# Patient Record
Sex: FEMALE | Race: WHITE | NOT HISPANIC OR LATINO | Employment: OTHER | URBAN - METROPOLITAN AREA
[De-identification: names, ages, dates, MRNs, and addresses within clinical notes are randomized per-mention and may not be internally consistent; named-entity substitution may affect disease eponyms.]

---

## 2024-07-27 ENCOUNTER — HOSPITAL ENCOUNTER (INPATIENT)
Facility: HOSPITAL | Age: 89
DRG: 322 | End: 2024-07-27
Attending: STUDENT IN AN ORGANIZED HEALTH CARE EDUCATION/TRAINING PROGRAM | Admitting: INTERNAL MEDICINE
Payer: MEDICARE

## 2024-07-27 ENCOUNTER — APPOINTMENT (OUTPATIENT)
Dept: CARDIOLOGY | Facility: HOSPITAL | Age: 89
DRG: 322 | End: 2024-07-27
Payer: MEDICARE

## 2024-07-27 DIAGNOSIS — I21.3 ST ELEVATION MYOCARDIAL INFARCTION (STEMI), UNSPECIFIED ARTERY (MULTI): Primary | ICD-10-CM

## 2024-07-27 DIAGNOSIS — I21.3 STEMI (ST ELEVATION MYOCARDIAL INFARCTION) (MULTI): ICD-10-CM

## 2024-07-27 DIAGNOSIS — I21.21 ST ELEVATION (STEMI) MYOCARDIAL INFARCTION INVOLVING LEFT CIRCUMFLEX CORONARY ARTERY (MULTI): ICD-10-CM

## 2024-07-27 LAB
ALBUMIN SERPL BCP-MCNC: 3.9 G/DL (ref 3.4–5)
ALP SERPL-CCNC: 81 U/L (ref 33–136)
ALT SERPL W P-5'-P-CCNC: 12 U/L (ref 7–45)
ANION GAP SERPL CALC-SCNC: 16 MMOL/L (ref 10–20)
AORTIC VALVE MEAN GRADIENT: 2 MMHG
AORTIC VALVE PEAK VELOCITY: 0.98 M/S
AST SERPL W P-5'-P-CCNC: 28 U/L (ref 9–39)
AV PEAK GRADIENT: 3.8 MMHG
AVA (PEAK VEL): 1.59 CM2
AVA (VTI): 1.59 CM2
BASOPHILS # BLD AUTO: 0.02 X10*3/UL (ref 0–0.1)
BASOPHILS NFR BLD AUTO: 0.2 %
BILIRUB SERPL-MCNC: 0.4 MG/DL (ref 0–1.2)
BUN SERPL-MCNC: 31 MG/DL (ref 6–23)
CALCIUM SERPL-MCNC: 10 MG/DL (ref 8.6–10.3)
CARDIAC TROPONIN I PNL SERPL HS: 916 NG/L (ref 0–13)
CARDIAC TROPONIN I PNL SERPL HS: ABNORMAL NG/L (ref 0–13)
CHLORIDE SERPL-SCNC: 102 MMOL/L (ref 98–107)
CHOLEST SERPL-MCNC: 234 MG/DL (ref 0–199)
CHOLESTEROL/HDL RATIO: 5.8
CO2 SERPL-SCNC: 20 MMOL/L (ref 21–32)
CREAT SERPL-MCNC: 1.65 MG/DL (ref 0.5–1.05)
EGFRCR SERPLBLD CKD-EPI 2021: 30 ML/MIN/1.73M*2
EJECTION FRACTION APICAL 4 CHAMBER: 33.6
EJECTION FRACTION: 38 %
EOSINOPHIL # BLD AUTO: 0.22 X10*3/UL (ref 0–0.4)
EOSINOPHIL NFR BLD AUTO: 1.8 %
ERYTHROCYTE [DISTWIDTH] IN BLOOD BY AUTOMATED COUNT: 12.6 % (ref 11.5–14.5)
GLUCOSE BLD MANUAL STRIP-MCNC: 215 MG/DL (ref 74–99)
GLUCOSE BLD MANUAL STRIP-MCNC: 219 MG/DL (ref 74–99)
GLUCOSE BLD MANUAL STRIP-MCNC: 233 MG/DL (ref 74–99)
GLUCOSE SERPL-MCNC: 230 MG/DL (ref 74–99)
HCT VFR BLD AUTO: 45.1 % (ref 36–46)
HDLC SERPL-MCNC: 40.4 MG/DL
HGB BLD-MCNC: 14 G/DL (ref 12–16)
IMM GRANULOCYTES # BLD AUTO: 0.08 X10*3/UL (ref 0–0.5)
IMM GRANULOCYTES NFR BLD AUTO: 0.6 % (ref 0–0.9)
LDLC SERPL CALC-MCNC: 136 MG/DL
LEFT VENTRICLE INTERNAL DIMENSION DIASTOLE: 3.17 CM (ref 3.5–6)
LEFT VENTRICULAR OUTFLOW TRACT DIAMETER: 1.7 CM
LYMPHOCYTES # BLD AUTO: 3.21 X10*3/UL (ref 0.8–3)
LYMPHOCYTES NFR BLD AUTO: 25.7 %
MCH RBC QN AUTO: 30.6 PG (ref 26–34)
MCHC RBC AUTO-ENTMCNC: 31 G/DL (ref 32–36)
MCV RBC AUTO: 99 FL (ref 80–100)
MITRAL VALVE E/A RATIO: 1
MONOCYTES # BLD AUTO: 0.9 X10*3/UL (ref 0.05–0.8)
MONOCYTES NFR BLD AUTO: 7.2 %
NEUTROPHILS # BLD AUTO: 8.07 X10*3/UL (ref 1.6–5.5)
NEUTROPHILS NFR BLD AUTO: 64.5 %
NON HDL CHOLESTEROL: 194 MG/DL (ref 0–149)
NRBC BLD-RTO: 0 /100 WBCS (ref 0–0)
PLATELET # BLD AUTO: 158 X10*3/UL (ref 150–450)
POTASSIUM SERPL-SCNC: 4.3 MMOL/L (ref 3.5–5.3)
PROT SERPL-MCNC: 8.5 G/DL (ref 6.4–8.2)
RBC # BLD AUTO: 4.57 X10*6/UL (ref 4–5.2)
RIGHT VENTRICLE FREE WALL PEAK S': 11.7 CM/S
RIGHT VENTRICLE PEAK SYSTOLIC PRESSURE: 42.7 MMHG
SODIUM SERPL-SCNC: 134 MMOL/L (ref 136–145)
TRICUSPID ANNULAR PLANE SYSTOLIC EXCURSION: 1.7 CM
TRIGL SERPL-MCNC: 290 MG/DL (ref 0–149)
VLDL: 58 MG/DL (ref 0–40)
WBC # BLD AUTO: 12.5 X10*3/UL (ref 4.4–11.3)

## 2024-07-27 PROCEDURE — 2500000004 HC RX 250 GENERAL PHARMACY W/ HCPCS (ALT 636 FOR OP/ED): Performed by: STUDENT IN AN ORGANIZED HEALTH CARE EDUCATION/TRAINING PROGRAM

## 2024-07-27 PROCEDURE — 4A023N7 MEASUREMENT OF CARDIAC SAMPLING AND PRESSURE, LEFT HEART, PERCUTANEOUS APPROACH: ICD-10-PCS | Performed by: INTERNAL MEDICINE

## 2024-07-27 PROCEDURE — C1887 CATHETER, GUIDING: HCPCS | Performed by: INTERNAL MEDICINE

## 2024-07-27 PROCEDURE — 2500000002 HC RX 250 W HCPCS SELF ADMINISTERED DRUGS (ALT 637 FOR MEDICARE OP, ALT 636 FOR OP/ED): Performed by: INTERNAL MEDICINE

## 2024-07-27 PROCEDURE — C1894 INTRO/SHEATH, NON-LASER: HCPCS | Performed by: INTERNAL MEDICINE

## 2024-07-27 PROCEDURE — 82947 ASSAY GLUCOSE BLOOD QUANT: CPT

## 2024-07-27 PROCEDURE — 93458 L HRT ARTERY/VENTRICLE ANGIO: CPT | Mod: 59 | Performed by: INTERNAL MEDICINE

## 2024-07-27 PROCEDURE — 2500000004 HC RX 250 GENERAL PHARMACY W/ HCPCS (ALT 636 FOR OP/ED): Performed by: INTERNAL MEDICINE

## 2024-07-27 PROCEDURE — 99152 MOD SED SAME PHYS/QHP 5/>YRS: CPT | Performed by: INTERNAL MEDICINE

## 2024-07-27 PROCEDURE — 93306 TTE W/DOPPLER COMPLETE: CPT | Performed by: STUDENT IN AN ORGANIZED HEALTH CARE EDUCATION/TRAINING PROGRAM

## 2024-07-27 PROCEDURE — 2550000001 HC RX 255 CONTRASTS: Performed by: INTERNAL MEDICINE

## 2024-07-27 PROCEDURE — 99285 EMERGENCY DEPT VISIT HI MDM: CPT | Mod: 25

## 2024-07-27 PROCEDURE — 2500000001 HC RX 250 WO HCPCS SELF ADMINISTERED DRUGS (ALT 637 FOR MEDICARE OP): Performed by: INTERNAL MEDICINE

## 2024-07-27 PROCEDURE — 36415 COLL VENOUS BLD VENIPUNCTURE: CPT | Performed by: STUDENT IN AN ORGANIZED HEALTH CARE EDUCATION/TRAINING PROGRAM

## 2024-07-27 PROCEDURE — 2780000003 HC OR 278 NO HCPCS: Performed by: INTERNAL MEDICINE

## 2024-07-27 PROCEDURE — 2500000001 HC RX 250 WO HCPCS SELF ADMINISTERED DRUGS (ALT 637 FOR MEDICARE OP): Performed by: STUDENT IN AN ORGANIZED HEALTH CARE EDUCATION/TRAINING PROGRAM

## 2024-07-27 PROCEDURE — 99291 CRITICAL CARE FIRST HOUR: CPT | Performed by: INTERNAL MEDICINE

## 2024-07-27 PROCEDURE — 2020000001 HC ICU ROOM DAILY

## 2024-07-27 PROCEDURE — C1874 STENT, COATED/COV W/DEL SYS: HCPCS | Performed by: INTERNAL MEDICINE

## 2024-07-27 PROCEDURE — 2500000002 HC RX 250 W HCPCS SELF ADMINISTERED DRUGS (ALT 637 FOR MEDICARE OP, ALT 636 FOR OP/ED): Performed by: STUDENT IN AN ORGANIZED HEALTH CARE EDUCATION/TRAINING PROGRAM

## 2024-07-27 PROCEDURE — B2111ZZ FLUOROSCOPY OF MULTIPLE CORONARY ARTERIES USING LOW OSMOLAR CONTRAST: ICD-10-PCS | Performed by: INTERNAL MEDICINE

## 2024-07-27 PROCEDURE — 96374 THER/PROPH/DIAG INJ IV PUSH: CPT

## 2024-07-27 PROCEDURE — 2720000007 HC OR 272 NO HCPCS: Performed by: INTERNAL MEDICINE

## 2024-07-27 PROCEDURE — 2500000005 HC RX 250 GENERAL PHARMACY W/O HCPCS: Performed by: INTERNAL MEDICINE

## 2024-07-27 PROCEDURE — 92941 PRQ TRLML REVSC TOT OCCL AMI: CPT | Performed by: INTERNAL MEDICINE

## 2024-07-27 PROCEDURE — 027135Z DILATION OF CORONARY ARTERY, TWO ARTERIES WITH TWO DRUG-ELUTING INTRALUMINAL DEVICES, PERCUTANEOUS APPROACH: ICD-10-PCS | Performed by: INTERNAL MEDICINE

## 2024-07-27 PROCEDURE — 85025 COMPLETE CBC W/AUTO DIFF WBC: CPT | Performed by: STUDENT IN AN ORGANIZED HEALTH CARE EDUCATION/TRAINING PROGRAM

## 2024-07-27 PROCEDURE — C9606 PERC D-E COR REVASC W AMI S: HCPCS | Performed by: INTERNAL MEDICINE

## 2024-07-27 PROCEDURE — 84075 ASSAY ALKALINE PHOSPHATASE: CPT | Performed by: STUDENT IN AN ORGANIZED HEALTH CARE EDUCATION/TRAINING PROGRAM

## 2024-07-27 PROCEDURE — 99233 SBSQ HOSP IP/OBS HIGH 50: CPT | Performed by: STUDENT IN AN ORGANIZED HEALTH CARE EDUCATION/TRAINING PROGRAM

## 2024-07-27 PROCEDURE — 83036 HEMOGLOBIN GLYCOSYLATED A1C: CPT | Performed by: INTERNAL MEDICINE

## 2024-07-27 PROCEDURE — C1725 CATH, TRANSLUMIN NON-LASER: HCPCS | Performed by: INTERNAL MEDICINE

## 2024-07-27 PROCEDURE — 84484 ASSAY OF TROPONIN QUANT: CPT | Performed by: INTERNAL MEDICINE

## 2024-07-27 PROCEDURE — 99153 MOD SED SAME PHYS/QHP EA: CPT | Performed by: INTERNAL MEDICINE

## 2024-07-27 PROCEDURE — 92928 PRQ TCAT PLMT NTRAC ST 1 LES: CPT | Performed by: INTERNAL MEDICINE

## 2024-07-27 PROCEDURE — 93458 L HRT ARTERY/VENTRICLE ANGIO: CPT | Performed by: INTERNAL MEDICINE

## 2024-07-27 PROCEDURE — C1769 GUIDE WIRE: HCPCS | Performed by: INTERNAL MEDICINE

## 2024-07-27 PROCEDURE — 84484 ASSAY OF TROPONIN QUANT: CPT | Performed by: STUDENT IN AN ORGANIZED HEALTH CARE EDUCATION/TRAINING PROGRAM

## 2024-07-27 PROCEDURE — 36415 COLL VENOUS BLD VENIPUNCTURE: CPT | Performed by: INTERNAL MEDICINE

## 2024-07-27 PROCEDURE — 80061 LIPID PANEL: CPT | Performed by: INTERNAL MEDICINE

## 2024-07-27 PROCEDURE — B2151ZZ FLUOROSCOPY OF LEFT HEART USING LOW OSMOLAR CONTRAST: ICD-10-PCS | Performed by: INTERNAL MEDICINE

## 2024-07-27 PROCEDURE — C1760 CLOSURE DEV, VASC: HCPCS | Performed by: INTERNAL MEDICINE

## 2024-07-27 PROCEDURE — 93306 TTE W/DOPPLER COMPLETE: CPT

## 2024-07-27 DEVICE — STENT ONYXNG35015UX ONYX 3.50X15RX
Type: IMPLANTABLE DEVICE | Site: HEART | Status: FUNCTIONAL
Brand: ONYX FRONTIER™

## 2024-07-27 RX ORDER — ACETAMINOPHEN 650 MG/1
650 SUPPOSITORY RECTAL EVERY 4 HOURS PRN
Status: DISCONTINUED | OUTPATIENT
Start: 2024-07-27 | End: 2024-07-29 | Stop reason: HOSPADM

## 2024-07-27 RX ORDER — PHENYLEPHRINE HCL IN 0.9% NACL 1 MG/10 ML
SYRINGE (ML) INTRAVENOUS AS NEEDED
Status: DISCONTINUED | OUTPATIENT
Start: 2024-07-27 | End: 2024-07-27 | Stop reason: HOSPADM

## 2024-07-27 RX ORDER — ACETAMINOPHEN 325 MG/1
650 TABLET ORAL EVERY 4 HOURS PRN
Status: DISCONTINUED | OUTPATIENT
Start: 2024-07-27 | End: 2024-07-29 | Stop reason: HOSPADM

## 2024-07-27 RX ORDER — LIDOCAINE HYDROCHLORIDE 20 MG/ML
INJECTION, SOLUTION INFILTRATION; PERINEURAL AS NEEDED
Status: DISCONTINUED | OUTPATIENT
Start: 2024-07-27 | End: 2024-07-27 | Stop reason: HOSPADM

## 2024-07-27 RX ORDER — FUROSEMIDE 10 MG/ML
20 INJECTION INTRAMUSCULAR; INTRAVENOUS ONCE
Status: COMPLETED | OUTPATIENT
Start: 2024-07-27 | End: 2024-07-27

## 2024-07-27 RX ORDER — ACETAMINOPHEN 160 MG/5ML
650 SOLUTION ORAL EVERY 4 HOURS PRN
Status: DISCONTINUED | OUTPATIENT
Start: 2024-07-27 | End: 2024-07-29 | Stop reason: HOSPADM

## 2024-07-27 RX ORDER — NAPROXEN SODIUM 220 MG/1
81 TABLET, FILM COATED ORAL DAILY
Status: DISCONTINUED | OUTPATIENT
Start: 2024-07-28 | End: 2024-07-29 | Stop reason: HOSPADM

## 2024-07-27 RX ORDER — LOSARTAN POTASSIUM 50 MG/1
50 TABLET ORAL DAILY
Status: DISCONTINUED | OUTPATIENT
Start: 2024-07-27 | End: 2024-07-27

## 2024-07-27 RX ORDER — HEPARIN SODIUM 1000 [USP'U]/ML
INJECTION, SOLUTION INTRAVENOUS; SUBCUTANEOUS AS NEEDED
Status: DISCONTINUED | OUTPATIENT
Start: 2024-07-27 | End: 2024-07-27 | Stop reason: HOSPADM

## 2024-07-27 RX ORDER — SODIUM CHLORIDE 9 MG/ML
75 INJECTION, SOLUTION INTRAVENOUS CONTINUOUS
Status: ACTIVE | OUTPATIENT
Start: 2024-07-27 | End: 2024-07-27

## 2024-07-27 RX ORDER — NAPROXEN SODIUM 220 MG/1
324 TABLET, FILM COATED ORAL ONCE
Status: DISCONTINUED | OUTPATIENT
Start: 2024-07-27 | End: 2024-07-27

## 2024-07-27 RX ORDER — ENOXAPARIN SODIUM 100 MG/ML
40 INJECTION SUBCUTANEOUS DAILY
Status: DISCONTINUED | OUTPATIENT
Start: 2024-07-28 | End: 2024-07-29 | Stop reason: HOSPADM

## 2024-07-27 RX ORDER — VERAPAMIL HYDROCHLORIDE 2.5 MG/ML
INJECTION, SOLUTION INTRAVENOUS AS NEEDED
Status: DISCONTINUED | OUTPATIENT
Start: 2024-07-27 | End: 2024-07-27 | Stop reason: HOSPADM

## 2024-07-27 RX ORDER — FENTANYL CITRATE 50 UG/ML
INJECTION, SOLUTION INTRAMUSCULAR; INTRAVENOUS AS NEEDED
Status: DISCONTINUED | OUTPATIENT
Start: 2024-07-27 | End: 2024-07-27 | Stop reason: HOSPADM

## 2024-07-27 RX ORDER — LOSARTAN POTASSIUM 25 MG/1
25 TABLET ORAL DAILY
Status: DISCONTINUED | OUTPATIENT
Start: 2024-07-27 | End: 2024-07-29 | Stop reason: HOSPADM

## 2024-07-27 RX ORDER — LEVOTHYROXINE SODIUM 150 UG/1
150 TABLET ORAL DAILY
Status: DISCONTINUED | OUTPATIENT
Start: 2024-07-27 | End: 2024-07-29 | Stop reason: HOSPADM

## 2024-07-27 RX ORDER — DAPAGLIFLOZIN 5 MG/1
5 TABLET, FILM COATED ORAL EVERY 24 HOURS
Status: DISCONTINUED | OUTPATIENT
Start: 2024-07-27 | End: 2024-07-29

## 2024-07-27 RX ORDER — DEXTROSE 50 % IN WATER (D50W) INTRAVENOUS SYRINGE
12.5
Status: DISCONTINUED | OUTPATIENT
Start: 2024-07-27 | End: 2024-07-29 | Stop reason: HOSPADM

## 2024-07-27 RX ORDER — METOPROLOL TARTRATE 25 MG/1
12.5 TABLET, FILM COATED ORAL EVERY 12 HOURS
Status: DISCONTINUED | OUTPATIENT
Start: 2024-07-27 | End: 2024-07-27

## 2024-07-27 RX ORDER — SODIUM CHLORIDE 9 MG/ML
INJECTION, SOLUTION INTRAVENOUS CONTINUOUS PRN
Status: COMPLETED | OUTPATIENT
Start: 2024-07-27 | End: 2024-07-27

## 2024-07-27 RX ORDER — MIDAZOLAM HYDROCHLORIDE 1 MG/ML
INJECTION, SOLUTION INTRAMUSCULAR; INTRAVENOUS AS NEEDED
Status: DISCONTINUED | OUTPATIENT
Start: 2024-07-27 | End: 2024-07-27 | Stop reason: HOSPADM

## 2024-07-27 RX ORDER — ATORVASTATIN CALCIUM 80 MG/1
80 TABLET, FILM COATED ORAL NIGHTLY
Status: DISCONTINUED | OUTPATIENT
Start: 2024-07-27 | End: 2024-07-29 | Stop reason: HOSPADM

## 2024-07-27 RX ORDER — METOPROLOL TARTRATE 25 MG/1
25 TABLET, FILM COATED ORAL EVERY 12 HOURS SCHEDULED
Status: DISCONTINUED | OUTPATIENT
Start: 2024-07-27 | End: 2024-07-27

## 2024-07-27 RX ORDER — IODIXANOL 270 MG/ML
INJECTION, SOLUTION INTRAVASCULAR AS NEEDED
Status: DISCONTINUED | OUTPATIENT
Start: 2024-07-27 | End: 2024-07-27 | Stop reason: HOSPADM

## 2024-07-27 RX ORDER — HEPARIN SODIUM 5000 [USP'U]/ML
60 INJECTION, SOLUTION INTRAVENOUS; SUBCUTANEOUS ONCE
Status: COMPLETED | OUTPATIENT
Start: 2024-07-27 | End: 2024-07-27

## 2024-07-27 RX ORDER — DEXTROSE 50 % IN WATER (D50W) INTRAVENOUS SYRINGE
25
Status: DISCONTINUED | OUTPATIENT
Start: 2024-07-27 | End: 2024-07-29 | Stop reason: HOSPADM

## 2024-07-27 RX ORDER — INSULIN LISPRO 100 [IU]/ML
0-5 INJECTION, SOLUTION INTRAVENOUS; SUBCUTANEOUS
Status: DISCONTINUED | OUTPATIENT
Start: 2024-07-27 | End: 2024-07-28 | Stop reason: DRUGHIGH

## 2024-07-27 RX ORDER — METOPROLOL SUCCINATE 25 MG/1
25 TABLET, EXTENDED RELEASE ORAL DAILY
Status: DISCONTINUED | OUTPATIENT
Start: 2024-07-27 | End: 2024-07-29 | Stop reason: HOSPADM

## 2024-07-27 RX ORDER — NITROGLYCERIN 0.4 MG/1
0.4 TABLET SUBLINGUAL EVERY 5 MIN PRN
Status: DISCONTINUED | OUTPATIENT
Start: 2024-07-27 | End: 2024-07-29 | Stop reason: HOSPADM

## 2024-07-27 SDOH — ECONOMIC STABILITY: HOUSING INSECURITY: AT ANY TIME IN THE PAST 12 MONTHS, WERE YOU HOMELESS OR LIVING IN A SHELTER (INCLUDING NOW)?: NO

## 2024-07-27 SDOH — SOCIAL STABILITY: SOCIAL INSECURITY: ARE YOU OR HAVE YOU BEEN THREATENED OR ABUSED PHYSICALLY, EMOTIONALLY, OR SEXUALLY BY ANYONE?: NO

## 2024-07-27 SDOH — SOCIAL STABILITY: SOCIAL INSECURITY: HAS ANYONE EVER THREATENED TO HURT YOUR FAMILY OR YOUR PETS?: NO

## 2024-07-27 SDOH — SOCIAL STABILITY: SOCIAL INSECURITY: DO YOU FEEL UNSAFE GOING BACK TO THE PLACE WHERE YOU ARE LIVING?: NO

## 2024-07-27 SDOH — SOCIAL STABILITY: SOCIAL INSECURITY: WERE YOU ABLE TO COMPLETE ALL THE BEHAVIORAL HEALTH SCREENINGS?: YES

## 2024-07-27 SDOH — ECONOMIC STABILITY: HOUSING INSECURITY: IN THE PAST 12 MONTHS, HOW MANY TIMES HAVE YOU MOVED WHERE YOU WERE LIVING?: 0

## 2024-07-27 SDOH — SOCIAL STABILITY: SOCIAL INSECURITY: DO YOU FEEL ANYONE HAS EXPLOITED OR TAKEN ADVANTAGE OF YOU FINANCIALLY OR OF YOUR PERSONAL PROPERTY?: NO

## 2024-07-27 SDOH — ECONOMIC STABILITY: TRANSPORTATION INSECURITY
IN THE PAST 12 MONTHS, HAS LACK OF TRANSPORTATION KEPT YOU FROM MEETINGS, WORK, OR FROM GETTING THINGS NEEDED FOR DAILY LIVING?: NO

## 2024-07-27 SDOH — ECONOMIC STABILITY: INCOME INSECURITY: HOW HARD IS IT FOR YOU TO PAY FOR THE VERY BASICS LIKE FOOD, HOUSING, MEDICAL CARE, AND HEATING?: NOT VERY HARD

## 2024-07-27 SDOH — SOCIAL STABILITY: SOCIAL INSECURITY: ABUSE: ADULT

## 2024-07-27 SDOH — SOCIAL STABILITY: SOCIAL INSECURITY: ARE THERE ANY APPARENT SIGNS OF INJURIES/BEHAVIORS THAT COULD BE RELATED TO ABUSE/NEGLECT?: NO

## 2024-07-27 SDOH — ECONOMIC STABILITY: INCOME INSECURITY: IN THE LAST 12 MONTHS, WAS THERE A TIME WHEN YOU WERE NOT ABLE TO PAY THE MORTGAGE OR RENT ON TIME?: NO

## 2024-07-27 SDOH — SOCIAL STABILITY: SOCIAL INSECURITY: HAVE YOU HAD ANY THOUGHTS OF HARMING ANYONE ELSE?: NO

## 2024-07-27 SDOH — SOCIAL STABILITY: SOCIAL INSECURITY: HAVE YOU HAD THOUGHTS OF HARMING ANYONE ELSE?: NO

## 2024-07-27 SDOH — SOCIAL STABILITY: SOCIAL INSECURITY: DOES ANYONE TRY TO KEEP YOU FROM HAVING/CONTACTING OTHER FRIENDS OR DOING THINGS OUTSIDE YOUR HOME?: NO

## 2024-07-27 SDOH — ECONOMIC STABILITY: TRANSPORTATION INSECURITY
IN THE PAST 12 MONTHS, HAS THE LACK OF TRANSPORTATION KEPT YOU FROM MEDICAL APPOINTMENTS OR FROM GETTING MEDICATIONS?: NO

## 2024-07-27 ASSESSMENT — ACTIVITIES OF DAILY LIVING (ADL)
DRESSING YOURSELF: INDEPENDENT
JUDGMENT_ADEQUATE_SAFELY_COMPLETE_DAILY_ACTIVITIES: YES
ADEQUATE_TO_COMPLETE_ADL: YES
BATHING: INDEPENDENT
GROOMING: INDEPENDENT
HEARING - LEFT EAR: FUNCTIONAL
TOILETING: INDEPENDENT
FEEDING YOURSELF: INDEPENDENT
HEARING - RIGHT EAR: FUNCTIONAL
PATIENT'S MEMORY ADEQUATE TO SAFELY COMPLETE DAILY ACTIVITIES?: YES
WALKS IN HOME: INDEPENDENT
ASSISTIVE_DEVICE: DENTURES PARTIAL

## 2024-07-27 ASSESSMENT — LIFESTYLE VARIABLES
HOW OFTEN DO YOU HAVE A DRINK CONTAINING ALCOHOL: NEVER
SUBSTANCE_ABUSE_PAST_12_MONTHS: NO
HOW MANY STANDARD DRINKS CONTAINING ALCOHOL DO YOU HAVE ON A TYPICAL DAY: PATIENT DOES NOT DRINK
EVER HAD A DRINK FIRST THING IN THE MORNING TO STEADY YOUR NERVES TO GET RID OF A HANGOVER: NO
HOW OFTEN DO YOU HAVE 6 OR MORE DRINKS ON ONE OCCASION: NEVER
AUDIT-C TOTAL SCORE: 0
SKIP TO QUESTIONS 9-10: 1
EVER FELT BAD OR GUILTY ABOUT YOUR DRINKING: NO
HAVE YOU EVER FELT YOU SHOULD CUT DOWN ON YOUR DRINKING: NO
HAVE PEOPLE ANNOYED YOU BY CRITICIZING YOUR DRINKING: NO
AUDIT-C TOTAL SCORE: 0
PRESCIPTION_ABUSE_PAST_12_MONTHS: NO
TOTAL SCORE: 0

## 2024-07-27 ASSESSMENT — PAIN - FUNCTIONAL ASSESSMENT
PAIN_FUNCTIONAL_ASSESSMENT: 0-10

## 2024-07-27 ASSESSMENT — COLUMBIA-SUICIDE SEVERITY RATING SCALE - C-SSRS
2. HAVE YOU ACTUALLY HAD ANY THOUGHTS OF KILLING YOURSELF?: NO
6. HAVE YOU EVER DONE ANYTHING, STARTED TO DO ANYTHING, OR PREPARED TO DO ANYTHING TO END YOUR LIFE?: NO
2. HAVE YOU ACTUALLY HAD ANY THOUGHTS OF KILLING YOURSELF?: NO
1. IN THE PAST MONTH, HAVE YOU WISHED YOU WERE DEAD OR WISHED YOU COULD GO TO SLEEP AND NOT WAKE UP?: NO
6. HAVE YOU EVER DONE ANYTHING, STARTED TO DO ANYTHING, OR PREPARED TO DO ANYTHING TO END YOUR LIFE?: NO
1. IN THE PAST MONTH, HAVE YOU WISHED YOU WERE DEAD OR WISHED YOU COULD GO TO SLEEP AND NOT WAKE UP?: NO

## 2024-07-27 ASSESSMENT — COGNITIVE AND FUNCTIONAL STATUS - GENERAL
MOBILITY SCORE: 24
DAILY ACTIVITIY SCORE: 24
PATIENT BASELINE BEDBOUND: NO

## 2024-07-27 ASSESSMENT — PAIN DESCRIPTION - PAIN TYPE: TYPE: ACUTE PAIN

## 2024-07-27 ASSESSMENT — PAIN SCALES - GENERAL
PAINLEVEL_OUTOF10: 1
PAINLEVEL_OUTOF10: 4
PAINLEVEL_OUTOF10: 0 - NO PAIN
PAINLEVEL_OUTOF10: 2

## 2024-07-27 ASSESSMENT — PAIN DESCRIPTION - DESCRIPTORS: DESCRIPTORS: ACHING

## 2024-07-27 ASSESSMENT — PATIENT HEALTH QUESTIONNAIRE - PHQ9
1. LITTLE INTEREST OR PLEASURE IN DOING THINGS: NOT AT ALL
SUM OF ALL RESPONSES TO PHQ9 QUESTIONS 1 & 2: 0
2. FEELING DOWN, DEPRESSED OR HOPELESS: NOT AT ALL

## 2024-07-27 ASSESSMENT — PAIN DESCRIPTION - LOCATION: LOCATION: CHEST

## 2024-07-27 NOTE — PROGRESS NOTES
Subjective Data:  Patient underwent successful primary PCI earlier this morning.  Currently denies having shortness of breath and reports some mild residual chest pain as well as back pain.  In sinus rhythm.     Objective Data:  Last Recorded Vitals:  Vitals:    07/27/24 0800 07/27/24 0834 07/27/24 0900 07/27/24 1000   BP: 150/77  141/67 147/88   Pulse: 90  102 97   Resp: 22  20 18   Temp: 35.9 °C (96.6 °F)      TempSrc:       SpO2: 95% 95% 95% 93%   Weight:       Height:           Last Labs:  CBC - 7/27/2024:  4:30 AM  12.5 14.0 158    45.1      CMP - 7/27/2024:  4:30 AM  10.0 8.5 28 --- 0.4   _ 3.9 12 81      PTT - No results in last year.  _   _ _     TROPHS   Date/Time Value Ref Range Status   07/27/2024 09:18 AM 87,649 0 - 13 ng/L Final     Comment:     Previous result verified on 7/27/2024 0516 on specimen/case [id number] called with component [facility] for procedure Troponin I, High Sensitivity with value 916 ng/L.   07/27/2024 04:30  0 - 13 ng/L Final     HGBA1C   Date/Time Value Ref Range Status   03/07/2024 08:13 AM 6.8 4.3 - 5.6 % Final     Comment:     American Diabetes Association guidelines indicate that patients with HgbA1c in the range 5.7-6.4% are at increased risk for development of diabetes, and intervention by lifestyle modification may be beneficial. HgbA1c greater or equal to 6.5% is considered diagnostic of diabetes.   08/01/2023 08:02 AM 7.1 4.3 - 5.6 % Final     Comment:     American Diabetes Association guidelines indicate that patients with HgbA1c in the range 5.7-6.4% are at increased risk for development of diabetes, and intervention by lifestyle modification may be beneficial. HgbA1c greater or equal to 6.5% is considered diagnostic of diabetes.     LDLCALC   Date/Time Value Ref Range Status   07/27/2024 04:30  <=99 mg/dL Final     Comment:                                 Near   Borderline      AGE      Desirable  Optimal    High     High     Very High     0-19 Y     0 -  "109     ---    110-129   >/= 130     ----    20-24 Y     0 - 119     ---    120-159   >/= 160     ----      >24 Y     0 -  99   100-129  130-159   160-189     >/=190       VLDL   Date/Time Value Ref Range Status   07/27/2024 04:30 AM 58 0 - 40 mg/dL Final      Last I/O:  I/O last 3 completed shifts:  In: - (0 mL/kg)   Out: 5 (0.1 mL/kg) [Blood:5]  Weight: 63 kg     Past Cardiology Tests (Last 3 Years):  EKG:  No results found for this or any previous visit from the past 1095 days.    Echo:  No results found for this or any previous visit from the past 1095 days.    Ejection Fractions:  No results found for: \"EF\"  Cath:  Cardiac Catheterization Procedure 07/27/2024    Stress Test:  No results found for this or any previous visit from the past 1095 days.    Cardiac Imaging:  No results found for this or any previous visit from the past 1095 days.      Inpatient Medications:  Scheduled medications   Medication Dose Route Frequency    [START ON 7/28/2024] aspirin  81 mg oral Daily    atorvastatin  80 mg oral Nightly    dapagliflozin propanediol  5 mg oral q24h    [START ON 7/28/2024] enoxaparin  40 mg subcutaneous Daily    insulin lispro  0-5 Units subcutaneous TID    levothyroxine  150 mcg oral Daily    losartan  25 mg oral Daily    metoprolol succinate XL  25 mg oral Daily    psyllium  1 packet oral Daily    ticagrelor  90 mg oral BID     PRN medications   Medication    acetaminophen    Or    acetaminophen    Or    acetaminophen    dextrose    dextrose    glucagon    glucagon    nitroglycerin    oxygen     Continuous Medications   Medication Dose Last Rate    sodium chloride 0.9%  75 mL/hr 75 mL/hr (07/27/24 0700)       Physical Exam:  General: Awake, alert/oriented x3, well developed, no acute distress.  Frail  Head: Atraumatic/Normocephalic  Eyes: Normal external exam, EOMI, PERRLA  ENT: Oropharynx normal, moist mucous membranes  Cardiovascular: RRR, S1/S2, no murmurs, rubs, or gallops, radial pulses +2, no edema of " extremities.  Good right radial access site healing.  Pulmonary: CTAB, no respiratory distress. No wheezes, rales, or ronchi  Abdomen: +BS, soft, non-tender, nondistended, no guarding or rebound  MSK: No joint swelling, normal movements of all extremities. Range of motion- normal.  Extremities: no edema, no cyanosis  Neuro: Alert/oriented x3, no focal motor or sensory deficits  Psychiatric: Judgment intact. Appropriate mood and behavior     Assessment/Plan   ST elevation MI  Hyperglycemia  Hypertension    I reviewed the patient's coronary angiogram and PCI and she received stents in her LAD and left circumflex with good result.  Flow in LAD remained to be slow.  Her echocardiogram this morning showed reduced ejection fraction of about 35 to 40% with evidence of anterior wall MI.  Will switch her metoprolol to Toprol and initiate Farxiga.  I will decrease the dose of losartan to have more blood pressure room.  Will further intensify GDMT as she tolerates while monitoring renal function and electrolytes and hemodynamics.  Continue monitor closely in heart center.  Will continue with dual antiplatelet therapy and referred the patient for cardiac rehabilitation.  Discussed with her nurse.

## 2024-07-27 NOTE — Clinical Note
Angioplasty of the proximal left anterior descending lesion. Inflation 1: Pressure = 16 gomez; Duration = 12 sec. Inflation 2: Pressure = 18 gomez; Duration = 12 sec.

## 2024-07-27 NOTE — H&P
History Of Present Illness:    Delmi Talavera is a 89 y.o. female presenting with CP.    Patient is an 89-year-old female with a history of hypertension, hyperlipidemia, diabetes, chronic kidney disease who presents with chest discomfort.  Patient states she has been having stuttering chest discomfort for 3 days.  Yesterday, she developed substernal chest discomfort.  She then awoke this morning with severe substernal chest discomfort.  She had nausea and emesis x 1.  She describes it as a pressure that radiates to her back.  She denies any shortness of breath or palpitations.    Twelve-lead ECG demonstrates lateral ST elevations.     Last Recorded Vitals:  Vitals:    07/27/24 0442 07/27/24 0445 07/27/24 0448 07/27/24 0502   BP:  133/80     Pulse: (!) 109 (!) 106 (!) 102    Resp: (!) 34 20 (!) 35    Temp:       SpO2: 97% 94% 95% 99%   Weight:       Height:           Last Labs:  CBC - 7/27/2024:  4:30 AM  12.5 14.0 158    45.1      CMP - 7/27/2024:  4:30 AM  10.0 8.5 28 --- 0.4   _ 3.9 12 81      PTT - No results in last year.  _   _ _     Troponin I, High Sensitivity   Date/Time Value Ref Range Status   07/27/2024 04:30  (HH) 0 - 13 ng/L Final     Hemoglobin A1C   Date/Time Value Ref Range Status   03/07/2024 08:13 AM 6.8 (H) 4.3 - 5.6 % Final     Comment:     American Diabetes Association guidelines indicate that patients with HgbA1c in the range 5.7-6.4% are at increased risk for development of diabetes, and intervention by lifestyle modification may be beneficial. HgbA1c greater or equal to 6.5% is considered diagnostic of diabetes.   08/01/2023 08:02 AM 7.1 (H) 4.3 - 5.6 % Final     Comment:     American Diabetes Association guidelines indicate that patients with HgbA1c in the range 5.7-6.4% are at increased risk for development of diabetes, and intervention by lifestyle modification may be beneficial. HgbA1c greater or equal to 6.5% is considered diagnostic of diabetes.      Last I/O:  No intake/output  "data recorded.    Past Cardiology Tests (Last 3 Years):  EKG:  No results found for this or any previous visit from the past 1095 days.    Echo:  No results found for this or any previous visit from the past 1095 days.    Ejection Fractions:  No results found for: \"EF\"  Cath:  No results found for this or any previous visit from the past 1095 days.    Stress Test:  No results found for this or any previous visit from the past 1095 days.    Cardiac Imaging:  No results found for this or any previous visit from the past 1095 days.      Past Medical History:  She has no past medical history on file.    Past Surgical History:  She has no past surgical history on file.      Social History:  She has no history on file for tobacco use, alcohol use, and drug use.  No cigarette smoking  Family History:  No family history on file.  No family history of coronary artery disease  Allergies:  Patient has no known allergies.    Inpatient Medications:  Scheduled medications   Medication Dose Route Frequency     PRN medications   Medication    fentaNYL PF    heparin    iodixanol    lidocaine    midazolam    nitroglycerin    phenylephrine in NS    sodium chloride    sodium chloride 0.9%    verapamil     Continuous Medications   Medication Dose Last Rate    sodium chloride        sodium chloride 0.9%   100 mL/hr (07/27/24 0455)     Outpatient Medications:  No current outpatient medications    Physical Exam:  Physical Exam  Vitals reviewed.   Constitutional:       Appearance: Normal appearance.   HENT:      Head: Normocephalic and atraumatic.      Mouth/Throat:      Mouth: Mucous membranes are moist.      Pharynx: Oropharynx is clear.   Eyes:      Extraocular Movements: Extraocular movements intact.      Conjunctiva/sclera: Conjunctivae normal.   Cardiovascular:      Rate and Rhythm: Normal rate and regular rhythm.      Pulses: Normal pulses.      Heart sounds: Normal heart sounds.   Pulmonary:      Effort: Pulmonary effort is normal. "      Breath sounds: Normal breath sounds.   Abdominal:      General: Bowel sounds are normal.      Palpations: Abdomen is soft.   Musculoskeletal:         General: No swelling.      Cervical back: Neck supple.   Skin:     General: Skin is warm and dry.   Neurological:      General: No focal deficit present.      Mental Status: She is alert.   Psychiatric:         Mood and Affect: Mood normal.         Behavior: Behavior normal.           Assessment/Plan   7/27/2024  1.  Lateral STEMI: Emergently taken to cardiac catheterization.  Found to have severe LAD and circumflex artery disease.  Despite EKG findings, LAD was felt to be the culprit.  Underwent drug-eluting stenting of both vessels.  EDP 35.  Ejection fraction 35% with anterolateral hypokinesis to akinesis.  2.  CAD: Continue DAPT and statin therapy.  Start beta-blockade with some reservations given LV function.  Continue losartan.  Echocardiogram is pending.  3.  Hypertension: As above.  4.  Hyperlipidemia: Lipids are pending.  Start high intensity statin therapy.  5.  Hypothyroidism: Continue Synthroid  6.  Diabetes: Will start insulin sliding scale.  7.  Further recommendations to follow    Peripheral IV 07/27/24 20 G Left Forearm (Active)   Site Assessment Clean;Dry;Intact 07/27/24 2447   Number of days: 0       Code Status:  No Order    Eduardo Stover MD

## 2024-07-27 NOTE — POST-PROCEDURE NOTE
Physician Transition of Care Summary  Invasive Cardiovascular Lab    Procedure Date: 7/27/2024  Attending:    * Eduardo Stover - Primary  Resident/Fellow/Other Assistant: Surgeons and Role:  * No surgeons found with a matching role *    Indications:   Pre-op Diagnosis      * STEMI (ST elevation myocardial infarction) (Multi) [I21.3]    Post-procedure diagnosis:   Post-op Diagnosis     * STEMI (ST elevation myocardial infarction) (Multi) [I21.3]    Procedure(s):   Left Heart Cath, No LV  36556 - KS L HRT CATH W/NJX L VENTRICULOGRAPHY IMG S&I    PCI  95595 - [place] PRQ TRLUML CORONARY ANGIOPLASTY ONE ART/BRANCH        Procedure Findings:   RANJIT LAD, RANJIT circumflex, mild RCA disease, ejection fraction 35%, EDP 35    Description of the Procedure:   Left heart cath, stent    Complications:   None    Stents/Implants:   Implants       Stent    Stent, Arlin [place] Ranjit, 3.50 X 15rx - [id number] - Implanted        Inventory item: STENT, ARLIN FRONTIER RANJIT, 3.50 X 15RX Model/Cat number: [id number]    : [employer] Lot number: [id number]    Device identifier: [id number]        As of 7/27/2024       Status: Implanted                      Stent, Arlin [place] Ranjit, 3.50 X 15rx - [id number] - Implanted        Inventory item: STENT, ARLIN FRONTIER RANJIT, 3.50 X 15RX Model/Cat number: [id number]    : [employer] Lot number: [id number]    Device identifier: [id number]        As of 7/27/2024       Status: Implanted                              Anticoagulation/Antiplatelet Plan:   Heparin, aspirin, Brilinta    Estimated Blood Loss:   * No values recorded between 7/27/2024  4:52 AM and 7/27/2024  5:59 AM *    Anesthesia: Moderate Sedation Anesthesia Staff: No anesthesia staff entered.    Any Specimen(s) Removed:   No specimens collected during this procedure.    Disposition:   [facility]      Electronically signed by: Eudardo Stover MD, 7/27/2024 5:59 AM

## 2024-07-27 NOTE — ED PROVIDER NOTES
HPI   Chief Complaint   Patient presents with    Chest Pain       Presents with chest pain described as pressure with associated nausea and vomiting.  Worsened this evening.  Prehospital EMS ECG with STEMI.  Patient was given aspirin and nitroglycerin prehospital.  Patient stated significant improvement with nitroglycerin.  No history of STEMI.      History provided by:  Patient and EMS personnel   used: No            Patient History   No past medical history on file.  No past surgical history on file.  No family history on file.  Social History     Tobacco Use    Smoking status: Not on file    Smokeless tobacco: Not on file   Substance Use Topics    Alcohol use: Not on file    Drug use: Not on file       Physical Exam   ED Triage Vitals [07/27/24 0417]   Temperature Heart Rate Respirations BP   36.5 °C (97.7 °F) (!) 110 18 (!) 145/91      Pulse Ox Temp src Heart Rate Source Patient Position   95 % -- -- --      BP Location FiO2 (%)     -- --       Physical Exam  Vitals and nursing note reviewed.   HENT:      Head: Atraumatic.      Mouth/Throat:      Mouth: Mucous membranes are moist.   Eyes:      Conjunctiva/sclera: Conjunctivae normal.   Cardiovascular:      Rate and Rhythm: Normal rate and regular rhythm.   Pulmonary:      Effort: Pulmonary effort is normal.      Breath sounds: Normal breath sounds.   Abdominal:      Palpations: Abdomen is soft.      Tenderness: There is no abdominal tenderness.   Musculoskeletal:         General: No deformity.      Cervical back: Normal range of motion.   Skin:     General: Skin is warm and dry.   Neurological:      Mental Status: She is alert.      Comments: Moving all extremities           ED Course & MDM   ED Course as of 07/27/24 0434   Sat Jul 27, 2024   0420 Discussed with Dr. BRUSH with Cardiology [AB]      ED Course User Index  [AB] Jose Williamson MD         Diagnoses as of 07/27/24 0434   ST elevation myocardial infarction (STEMI), unspecified artery  (Multi)                       Michelle Coma Scale Score: 15                        Medical Decision Making  89-year-old female without prior MI presents with chest pain with associated nausea/vomiting found to have a STEMI with ST elevations in the high lateral leads with reciprocal depressions in the inferior leads.  Code STEMI called.  Spoke with interventional cardiologist.  Given heparin and Brilinta in the emergency department after receiving aspirin by EMS.  Given additional dose of nitroglycerin emergency department for recurrence of chest pain.  Emergently taken to the Cath Lab.    Amount and/or Complexity of Data Reviewed  Independent Historian: EMS  Labs: ordered.  ECG/medicine tests: ordered and independent interpretation performed.     Details: My ECG interpretation: Normal sinus rhythm, heart rate 110, there is ST segment elevation in leads I and aVL, there is ST segment depressions in the inferior leads, QTc 478; concerning for STEMI  Discussion of management or test interpretation with external provider(s): The interventional cardiologist on-call, Dr. Stover    Risk  Decision regarding hospitalization.        Procedure  Critical Care    Performed by: Jose Williamson MD  Authorized by: Jose Williamson MD    Critical care provider statement:     Critical care time (minutes):  15    Critical care time was exclusive of:  Separately billable procedures and treating other patients    Critical care was necessary to treat or prevent imminent or life-threatening deterioration of the following conditions:  Cardiac failure    Critical care was time spent personally by me on the following activities:  Blood draw for specimens, development of treatment plan with patient or surrogate, discussions with consultants, evaluation of patient's response to treatment, examination of patient, obtaining history from patient or surrogate, ordering and performing treatments and interventions, ordering and review of  laboratory studies, pulse oximetry and re-evaluation of patient's condition    Care discussed with: admitting provider         Jose Williamson MD  07/27/24 0774

## 2024-07-27 NOTE — Clinical Note
Angioplasty of the proximal left anterior descending lesion. Inflation 1: Pressure = 10 gomez; Duration = 8 sec.

## 2024-07-27 NOTE — ED TRIAGE NOTES
Pt presents to ED with c/o chest pain x 2 days. Pt also c/o nausea and vomiting tonight. Pt states she took baking soda with mild relief. STEMI called at 0409 prior to arrival.

## 2024-07-28 VITALS
SYSTOLIC BLOOD PRESSURE: 108 MMHG | WEIGHT: 141.2 LBS | OXYGEN SATURATION: 92 % | HEIGHT: 61 IN | DIASTOLIC BLOOD PRESSURE: 58 MMHG | BODY MASS INDEX: 26.66 KG/M2 | HEART RATE: 80 BPM | TEMPERATURE: 96.8 F | RESPIRATION RATE: 24 BRPM

## 2024-07-28 LAB
ANION GAP SERPL CALC-SCNC: 16 MMOL/L (ref 10–20)
BUN SERPL-MCNC: 34 MG/DL (ref 6–23)
CALCIUM SERPL-MCNC: 9.3 MG/DL (ref 8.6–10.3)
CHLORIDE SERPL-SCNC: 101 MMOL/L (ref 98–107)
CO2 SERPL-SCNC: 22 MMOL/L (ref 21–32)
CREAT SERPL-MCNC: 1.73 MG/DL (ref 0.5–1.05)
EGFRCR SERPLBLD CKD-EPI 2021: 28 ML/MIN/1.73M*2
ERYTHROCYTE [DISTWIDTH] IN BLOOD BY AUTOMATED COUNT: 12.8 % (ref 11.5–14.5)
EST. AVERAGE GLUCOSE BLD GHB EST-MCNC: 166 MG/DL
GLUCOSE BLD MANUAL STRIP-MCNC: 160 MG/DL (ref 74–99)
GLUCOSE BLD MANUAL STRIP-MCNC: 169 MG/DL (ref 74–99)
GLUCOSE BLD MANUAL STRIP-MCNC: 224 MG/DL (ref 74–99)
GLUCOSE SERPL-MCNC: 219 MG/DL (ref 74–99)
HBA1C MFR BLD: 7.4 %
HCT VFR BLD AUTO: 39.3 % (ref 36–46)
HGB BLD-MCNC: 13 G/DL (ref 12–16)
MAGNESIUM SERPL-MCNC: 1.66 MG/DL (ref 1.6–2.4)
MCH RBC QN AUTO: 30.7 PG (ref 26–34)
MCHC RBC AUTO-ENTMCNC: 33.1 G/DL (ref 32–36)
MCV RBC AUTO: 93 FL (ref 80–100)
NRBC BLD-RTO: 0 /100 WBCS (ref 0–0)
PLATELET # BLD AUTO: 162 X10*3/UL (ref 150–450)
POTASSIUM SERPL-SCNC: 3.7 MMOL/L (ref 3.5–5.3)
RBC # BLD AUTO: 4.23 X10*6/UL (ref 4–5.2)
SODIUM SERPL-SCNC: 135 MMOL/L (ref 136–145)
WBC # BLD AUTO: 19.1 X10*3/UL (ref 4.4–11.3)

## 2024-07-28 PROCEDURE — 80048 BASIC METABOLIC PNL TOTAL CA: CPT | Performed by: INTERNAL MEDICINE

## 2024-07-28 PROCEDURE — 85027 COMPLETE CBC AUTOMATED: CPT | Performed by: INTERNAL MEDICINE

## 2024-07-28 PROCEDURE — 82947 ASSAY GLUCOSE BLOOD QUANT: CPT

## 2024-07-28 PROCEDURE — 2500000002 HC RX 250 W HCPCS SELF ADMINISTERED DRUGS (ALT 637 FOR MEDICARE OP, ALT 636 FOR OP/ED): Performed by: INTERNAL MEDICINE

## 2024-07-28 PROCEDURE — 2500000002 HC RX 250 W HCPCS SELF ADMINISTERED DRUGS (ALT 637 FOR MEDICARE OP, ALT 636 FOR OP/ED): Performed by: STUDENT IN AN ORGANIZED HEALTH CARE EDUCATION/TRAINING PROGRAM

## 2024-07-28 PROCEDURE — 36415 COLL VENOUS BLD VENIPUNCTURE: CPT | Performed by: INTERNAL MEDICINE

## 2024-07-28 PROCEDURE — 2500000001 HC RX 250 WO HCPCS SELF ADMINISTERED DRUGS (ALT 637 FOR MEDICARE OP): Performed by: STUDENT IN AN ORGANIZED HEALTH CARE EDUCATION/TRAINING PROGRAM

## 2024-07-28 PROCEDURE — 2500000004 HC RX 250 GENERAL PHARMACY W/ HCPCS (ALT 636 FOR OP/ED): Performed by: STUDENT IN AN ORGANIZED HEALTH CARE EDUCATION/TRAINING PROGRAM

## 2024-07-28 PROCEDURE — 2500000001 HC RX 250 WO HCPCS SELF ADMINISTERED DRUGS (ALT 637 FOR MEDICARE OP): Performed by: INTERNAL MEDICINE

## 2024-07-28 PROCEDURE — 99233 SBSQ HOSP IP/OBS HIGH 50: CPT | Performed by: STUDENT IN AN ORGANIZED HEALTH CARE EDUCATION/TRAINING PROGRAM

## 2024-07-28 PROCEDURE — 2020000001 HC ICU ROOM DAILY

## 2024-07-28 PROCEDURE — 83735 ASSAY OF MAGNESIUM: CPT | Performed by: STUDENT IN AN ORGANIZED HEALTH CARE EDUCATION/TRAINING PROGRAM

## 2024-07-28 RX ORDER — MAGNESIUM SULFATE HEPTAHYDRATE 40 MG/ML
2 INJECTION, SOLUTION INTRAVENOUS ONCE
Status: COMPLETED | OUTPATIENT
Start: 2024-07-28 | End: 2024-07-28

## 2024-07-28 RX ORDER — INSULIN LISPRO 100 [IU]/ML
0-10 INJECTION, SOLUTION INTRAVENOUS; SUBCUTANEOUS
Status: DISCONTINUED | OUTPATIENT
Start: 2024-07-28 | End: 2024-07-29 | Stop reason: HOSPADM

## 2024-07-28 RX ORDER — POTASSIUM CHLORIDE 20 MEQ/1
40 TABLET, EXTENDED RELEASE ORAL ONCE
Status: COMPLETED | OUTPATIENT
Start: 2024-07-28 | End: 2024-07-28

## 2024-07-28 RX ORDER — FUROSEMIDE 10 MG/ML
20 INJECTION INTRAMUSCULAR; INTRAVENOUS ONCE
Status: COMPLETED | OUTPATIENT
Start: 2024-07-28 | End: 2024-07-28

## 2024-07-28 ASSESSMENT — PAIN - FUNCTIONAL ASSESSMENT
PAIN_FUNCTIONAL_ASSESSMENT: 0-10

## 2024-07-28 ASSESSMENT — PAIN SCALES - GENERAL
PAINLEVEL_OUTOF10: 0 - NO PAIN
PAINLEVEL_OUTOF10: 0 - NO PAIN
PAINLEVEL_OUTOF10: 3
PAINLEVEL_OUTOF10: 2
PAINLEVEL_OUTOF10: 0 - NO PAIN
PAINLEVEL_OUTOF10: 0 - NO PAIN

## 2024-07-28 ASSESSMENT — PAIN DESCRIPTION - DESCRIPTORS
DESCRIPTORS: ACHING
DESCRIPTORS: ACHING

## 2024-07-28 ASSESSMENT — PAIN DESCRIPTION - LOCATION: LOCATION: HEAD

## 2024-07-28 NOTE — PROGRESS NOTES
Subjective Data:  No chest pain.  Still short of breath with walking.  Her saturation was in the low 90s when walking around and 2 L of oxygen supplementation.  Sinus rhythm.  Objective Data:  Last Recorded Vitals:  Vitals:    07/28/24 0400 07/28/24 0500 07/28/24 0600 07/28/24 0800   BP: 106/60 107/55 106/57    Pulse: 81 81 82    Resp: 19 26 22    Temp: 36.9 °C (98.4 °F)   36.5 °C (97.7 °F)   TempSrc: Temporal   Temporal   SpO2: 94% 94% 94%    Weight:       Height:           Last Labs:  CBC - 7/28/2024:  5:53 AM  19.1 13.0 162    39.3      CMP - 7/28/2024:  5:53 AM  9.3 8.5 28 --- 0.4   _ 3.9 12 81      PTT - No results in last year.  _   _ _     TROPHS   Date/Time Value Ref Range Status   07/27/2024 09:18 AM 87,649 0 - 13 ng/L Final     Comment:     Previous result verified on 7/27/2024 0516 on specimen/case [id number] called with component [facility] for procedure Troponin I, High Sensitivity with value 916 ng/L.   07/27/2024 04:30  0 - 13 ng/L Final     HGBA1C   Date/Time Value Ref Range Status   07/27/2024 09:18 AM 7.4 see below % Final   03/07/2024 08:13 AM 6.8 4.3 - 5.6 % Final     Comment:     American Diabetes Association guidelines indicate that patients with HgbA1c in the range 5.7-6.4% are at increased risk for development of diabetes, and intervention by lifestyle modification may be beneficial. HgbA1c greater or equal to 6.5% is considered diagnostic of diabetes.   08/01/2023 08:02 AM 7.1 4.3 - 5.6 % Final     Comment:     American Diabetes Association guidelines indicate that patients with HgbA1c in the range 5.7-6.4% are at increased risk for development of diabetes, and intervention by lifestyle modification may be beneficial. HgbA1c greater or equal to 6.5% is considered diagnostic of diabetes.     LDLCALC   Date/Time Value Ref Range Status   07/27/2024 04:30  <=99 mg/dL Final     Comment:                                 Near   Borderline      AGE      Desirable  Optimal    High     High      Very High     0-19 Y     0 - 109     ---    110-129   >/= 130     ----    20-24 Y     0 - 119     ---    120-159   >/= 160     ----      >24 Y     0 -  99   100-129  130-159   160-189     >/=190       VLDL   Date/Time Value Ref Range Status   07/27/2024 04:30 AM 58 0 - 40 mg/dL Final      Last I/O:  I/O last 3 completed shifts:  In: - (0 mL/kg)   Out: 780 (12.4 mL/kg) [Urine:775 (0.3 mL/kg/hr); Blood:5]  Weight: 63 kg     Past Cardiology Tests (Last 3 Years):  EKG:  No results found for this or any previous visit from the past 1095 days.    Echo:  No results found for this or any previous visit from the past 1095 days.    Ejection Fractions:  EF   Date/Time Value Ref Range Status   07/27/2024 10:21 AM 38 %      Cath:  Cardiac Catheterization Procedure 07/27/2024    Stress Test:  No results found for this or any previous visit from the past 1095 days.    Cardiac Imaging:  No results found for this or any previous visit from the past 1095 days.      Inpatient Medications:  Scheduled medications   Medication Dose Route Frequency    aspirin  81 mg oral Daily    atorvastatin  80 mg oral Nightly    dapagliflozin propanediol  5 mg oral q24h    enoxaparin  40 mg subcutaneous Daily    furosemide  20 mg intravenous Once    insulin lispro  0-10 Units subcutaneous TID    levothyroxine  150 mcg oral Daily    losartan  25 mg oral Daily    magnesium sulfate  2 g intravenous Once    metoprolol succinate XL  25 mg oral Daily    psyllium  1 packet oral Daily    ticagrelor  90 mg oral BID     PRN medications   Medication    acetaminophen    Or    acetaminophen    Or    acetaminophen    dextrose    dextrose    glucagon    glucagon    nitroglycerin    oxygen     Continuous Medications   Medication Dose Last Rate       Physical Exam:  General: Awake, alert/oriented x3, well developed, no acute distress.  Frail  Head: Atraumatic/Normocephalic  Eyes: Normal external exam, EOMI, PERRLA  ENT: Oropharynx normal, moist mucous  membranes  Cardiovascular: RRR, S1/S2, no murmurs, rubs, or gallops, radial pulses +2, no edema of extremities.  Good right radial access site healing.  Pulmonary: CTAB, no respiratory distress. No wheezes, rales, or ronchi  Abdomen: +BS, soft, non-tender, nondistended, no guarding or rebound  MSK: No joint swelling, normal movements of all extremities. Range of motion- normal.  Extremities: no edema, no cyanosis  Neuro: Alert/oriented x3, no focal motor or sensory deficits  Psychiatric: Judgment intact. Appropriate mood and behavior     Assessment/Plan   ST elevation MI  Hyperglycemia  Hypertension    I reviewed the patient's coronary angiogram and PCI and she received stents in her LAD and left circumflex with good result.  Flow in LAD remained to be slow.  Her echocardiogram this morning showed reduced ejection fraction of about 35 to 40% with evidence of anterior wall MI.  Will switch her metoprolol to Toprol and initiate Farxiga.  I will decrease the dose of losartan to have more blood pressure room.  Will further intensify GDMT as she tolerates while monitoring renal function and electrolytes and hemodynamics.  Continue monitor closely in heart center.  Will continue with dual antiplatelet therapy and referred the patient for cardiac rehabilitation.  Discussed with her nurse.    July 28, 2024  Replace her hypokalemia and hypomagnesemia.  She does have diabetes with hemoglobin A1c of 7.4.  Used to be on glimepiride before which I believe is not a good medication for her condition considering her age and CKD.  Will continue with Farxiga for now.  Will going to give her a dose of IV Lasix later today 20 mg to help with her shortness of breath.  Dr Stover will resume seeing her from tomorrow.  Discussed with nurse.

## 2024-07-28 NOTE — CARE PLAN
Problem: ACS/CP/NSTEMI/STEMI  Goal: Chest pain managed (free from pain or at acceptable level)  Outcome: Progressing  Goal: Lab values return to normal range  Outcome: Progressing  Goal: Promote self management  Outcome: Progressing  Goal: Serial ECG will return to baseline  Outcome: Progressing  Goal: Verbalize understanding of procedures/devices  Outcome: Progressing     Problem: Cardiac catheterization  Goal: Free from dysrhythmias  Outcome: Progressing  Goal: Free from pain  Outcome: Progressing  Goal: No evidence of post procedure complications  Outcome: Progressing  Goal: Promote self management  Outcome: Progressing  Goal: Verbalize understanding of procedure  Outcome: Progressing  Goal: Care and maintenance of device (specify)  Outcome: Progressing     Problem: Fall/Injury  Goal: Not fall by end of shift  Outcome: Progressing  Goal: Be free from injury by end of the shift  Outcome: Progressing  Goal: Verbalize understanding of personal risk factors for fall in the hospital  Outcome: Progressing  Goal: Pace activities to prevent fatigue by end of the shift  Outcome: Progressing  Goal: Verbalize understanding of risk factor reduction measures to prevent injury from fall in the home  Outcome: Progressing  Goal: Use assistive devices by end of the shift  Outcome: Progressing     Problem: Skin  Goal: Participates in plan/prevention/treatment measures  Outcome: Progressing  Goal: Prevent/minimize sheer/friction injuries  Outcome: Progressing  Goal: Promote/optimize nutrition  Outcome: Progressing     Problem: Pain - Adult  Goal: Verbalizes/displays adequate comfort level or baseline comfort level  Outcome: Progressing     Problem: Safety - Adult  Goal: Free from fall injury  Outcome: Progressing     Problem: Discharge Planning  Goal: Discharge to home or other facility with appropriate resources  Outcome: Progressing     Problem: Chronic Conditions and Co-morbidities  Goal: Patient's chronic conditions and  co-morbidity symptoms are monitored and maintained or improved  Outcome: Progressing   The patient's goals for the shift include      The clinical goals for the shift include pt will remain hemodynamically stable

## 2024-07-28 NOTE — NURSING NOTE
Pt ambulated in hallway on 3L, sat 91%. RN to notify MD.     1005- RN notified Dr Cooley of pulse ox with ambulation. MD to place order for lasix,

## 2024-07-29 VITALS
DIASTOLIC BLOOD PRESSURE: 58 MMHG | HEIGHT: 61 IN | WEIGHT: 141.09 LBS | TEMPERATURE: 97.2 F | SYSTOLIC BLOOD PRESSURE: 109 MMHG | OXYGEN SATURATION: 92 % | HEART RATE: 80 BPM | BODY MASS INDEX: 26.64 KG/M2 | RESPIRATION RATE: 22 BRPM

## 2024-07-29 LAB
ANION GAP SERPL CALC-SCNC: 13 MMOL/L (ref 10–20)
BUN SERPL-MCNC: 41 MG/DL (ref 6–23)
CALCIUM SERPL-MCNC: 9.5 MG/DL (ref 8.6–10.3)
CHLORIDE SERPL-SCNC: 98 MMOL/L (ref 98–107)
CO2 SERPL-SCNC: 25 MMOL/L (ref 21–32)
CREAT SERPL-MCNC: 1.94 MG/DL (ref 0.5–1.05)
EGFRCR SERPLBLD CKD-EPI 2021: 24 ML/MIN/1.73M*2
GLUCOSE BLD MANUAL STRIP-MCNC: 180 MG/DL (ref 74–99)
GLUCOSE SERPL-MCNC: 189 MG/DL (ref 74–99)
HOLD SPECIMEN: NORMAL
MAGNESIUM SERPL-MCNC: 2.01 MG/DL (ref 1.6–2.4)
POTASSIUM SERPL-SCNC: 4.2 MMOL/L (ref 3.5–5.3)
SODIUM SERPL-SCNC: 132 MMOL/L (ref 136–145)

## 2024-07-29 PROCEDURE — 2500000001 HC RX 250 WO HCPCS SELF ADMINISTERED DRUGS (ALT 637 FOR MEDICARE OP): Performed by: INTERNAL MEDICINE

## 2024-07-29 PROCEDURE — 82374 ASSAY BLOOD CARBON DIOXIDE: CPT | Performed by: STUDENT IN AN ORGANIZED HEALTH CARE EDUCATION/TRAINING PROGRAM

## 2024-07-29 PROCEDURE — 2500000004 HC RX 250 GENERAL PHARMACY W/ HCPCS (ALT 636 FOR OP/ED): Performed by: STUDENT IN AN ORGANIZED HEALTH CARE EDUCATION/TRAINING PROGRAM

## 2024-07-29 PROCEDURE — 2500000002 HC RX 250 W HCPCS SELF ADMINISTERED DRUGS (ALT 637 FOR MEDICARE OP, ALT 636 FOR OP/ED): Performed by: INTERNAL MEDICINE

## 2024-07-29 PROCEDURE — 2500000002 HC RX 250 W HCPCS SELF ADMINISTERED DRUGS (ALT 637 FOR MEDICARE OP, ALT 636 FOR OP/ED): Performed by: STUDENT IN AN ORGANIZED HEALTH CARE EDUCATION/TRAINING PROGRAM

## 2024-07-29 PROCEDURE — 82947 ASSAY GLUCOSE BLOOD QUANT: CPT

## 2024-07-29 PROCEDURE — 36415 COLL VENOUS BLD VENIPUNCTURE: CPT | Performed by: STUDENT IN AN ORGANIZED HEALTH CARE EDUCATION/TRAINING PROGRAM

## 2024-07-29 PROCEDURE — 83735 ASSAY OF MAGNESIUM: CPT | Performed by: STUDENT IN AN ORGANIZED HEALTH CARE EDUCATION/TRAINING PROGRAM

## 2024-07-29 PROCEDURE — 99238 HOSP IP/OBS DSCHRG MGMT 30/<: CPT | Performed by: INTERNAL MEDICINE

## 2024-07-29 PROCEDURE — 2500000001 HC RX 250 WO HCPCS SELF ADMINISTERED DRUGS (ALT 637 FOR MEDICARE OP): Performed by: STUDENT IN AN ORGANIZED HEALTH CARE EDUCATION/TRAINING PROGRAM

## 2024-07-29 RX ORDER — METOPROLOL SUCCINATE 25 MG/1
25 TABLET, EXTENDED RELEASE ORAL DAILY
Qty: 30 TABLET | Refills: 1 | Status: SHIPPED | OUTPATIENT
Start: 2024-07-30 | End: 2024-09-28

## 2024-07-29 RX ORDER — LOSARTAN POTASSIUM 25 MG/1
25 TABLET ORAL DAILY
Qty: 30 TABLET | Refills: 1 | Status: SHIPPED | OUTPATIENT
Start: 2024-07-30 | End: 2024-09-28

## 2024-07-29 RX ORDER — NITROGLYCERIN 0.4 MG/1
0.4 TABLET SUBLINGUAL EVERY 5 MIN PRN
Qty: 30 TABLET | Refills: 0 | Status: SHIPPED | OUTPATIENT
Start: 2024-07-29 | End: 2024-08-28

## 2024-07-29 RX ORDER — ATORVASTATIN CALCIUM 80 MG/1
80 TABLET, FILM COATED ORAL NIGHTLY
Qty: 30 TABLET | Refills: 1 | Status: SHIPPED | OUTPATIENT
Start: 2024-07-29 | End: 2024-09-27

## 2024-07-29 RX ORDER — DAPAGLIFLOZIN 5 MG/1
5 TABLET, FILM COATED ORAL EVERY 24 HOURS
Qty: 30 TABLET | Refills: 1 | Status: SHIPPED | OUTPATIENT
Start: 2024-07-29 | End: 2024-09-27

## 2024-07-29 RX ORDER — DAPAGLIFLOZIN 10 MG/1
10 TABLET, FILM COATED ORAL EVERY 24 HOURS
Status: DISCONTINUED | OUTPATIENT
Start: 2024-07-29 | End: 2024-07-29 | Stop reason: HOSPADM

## 2024-07-29 RX ORDER — LEVOTHYROXINE SODIUM 150 UG/1
150 TABLET ORAL DAILY
Qty: 30 TABLET | Refills: 1 | Status: SHIPPED | OUTPATIENT
Start: 2024-07-30 | End: 2024-09-28

## 2024-07-29 RX ORDER — NAPROXEN SODIUM 220 MG/1
81 TABLET, FILM COATED ORAL DAILY
Qty: 90 TABLET | Refills: 3 | Status: SHIPPED | OUTPATIENT
Start: 2024-07-30 | End: 2025-07-30

## 2024-07-29 ASSESSMENT — PAIN - FUNCTIONAL ASSESSMENT: PAIN_FUNCTIONAL_ASSESSMENT: 0-10

## 2024-07-29 ASSESSMENT — PAIN SCALES - GENERAL: PAINLEVEL_OUTOF10: 0 - NO PAIN

## 2024-07-29 NOTE — DISCHARGE SUMMARY
Discharge Diagnosis  ST elevation myocardial infarction (STEMI), unspecified artery (Multi)    Issues Requiring Follow-Up  Medical therapy for CAD  Repeat limited echo for LV function    Test Results Pending At Discharge  Pending Labs       No current pending labs.            Hospital Course   Please see admission emphasis.  Physical for details.  Briefly, the patient is a 89-year-old female with a history of hypertension, dyslipidemia, hypothyroidism who presents with stuttering chest pain for 3 days.  Twelve-lead ECG demonstrates anterior Q waves with ST elevations as well as ST elevations in the lateral leads.  She was emergently taken to cardiac catheterization.  She had a 99% proximal LAD stenosis as well as an 80 to 90% circumflex artery stenosis.  She underwent drug-eluting stenting of both these vessels.  Peak troponin was 89,000.  Echocardiogram demonstrated an ejection fraction of 35 to 40%.  She was placed on medical therapy.  She was given a dose of Lasix.  She is placed on Farxiga to decrease her risk of repeat hospitalization for heart failure.  She was discharged in stable condition to follow-up as an outpatient.    Pertinent Physical Exam At Time of Discharge  Physical Exam  Vitals reviewed.   Constitutional:       Appearance: Normal appearance.   HENT:      Head: Normocephalic and atraumatic.      Mouth/Throat:      Mouth: Mucous membranes are moist.      Pharynx: Oropharynx is clear.   Eyes:      Extraocular Movements: Extraocular movements intact.      Conjunctiva/sclera: Conjunctivae normal.   Cardiovascular:      Rate and Rhythm: Normal rate and regular rhythm.      Pulses: Normal pulses.      Heart sounds: Normal heart sounds.   Pulmonary:      Effort: Pulmonary effort is normal.      Breath sounds: Normal breath sounds.   Abdominal:      General: Bowel sounds are normal.      Palpations: Abdomen is soft.   Musculoskeletal:         General: No swelling.      Cervical back: Neck supple.    Skin:     General: Skin is warm and dry.   Neurological:      General: No focal deficit present.      Mental Status: She is alert.   Psychiatric:         Mood and Affect: Mood normal.         Behavior: Behavior normal.         Home Medications     Medication List      START taking these medications     aspirin 81 mg chewable tablet; Chew 1 tablet (81 mg) once daily.; Start   taking on: July 30, 2024   atorvastatin 80 mg tablet; Commonly known as: Lipitor; Take 1 tablet (80   mg) by mouth once daily at bedtime.   dapagliflozin propanediol 5 mg; Commonly known as: Farxiga; Take 1   tablet (5 mg) by mouth once every 24 hours.   levothyroxine 150 mcg tablet; Commonly known as: Synthroid, Levoxyl;   Take 1 tablet (150 mcg) by mouth early in the morning.. Take on an empty   stomach at the same time each day, either 30 to 60 minutes prior to   breakfast; Start taking on: July 30, 2024   losartan 25 mg tablet; Commonly known as: Cozaar; Take 1 tablet (25 mg)   by mouth once daily.; Start taking on: July 30, 2024   metoprolol succinate XL 25 mg 24 hr tablet; Commonly known as:   Toprol-XL; Take 1 tablet (25 mg) by mouth once daily. Do not crush or   chew.; Start taking on: July 30, 2024   nitroglycerin 0.4 mg SL tablet; Commonly known as: Nitrostat; Place 1   tablet (0.4 mg) under the tongue every 5 minutes if needed for chest pain.   ticagrelor 90 mg tablet; Commonly known as: Brilinta; Take 1 tablet (90   mg) by mouth 2 times a day.       Outpatient Follow-Up  No future appointments.    Eduardo Stover MD

## 2024-07-29 NOTE — DISCHARGE INSTRUCTIONS
CARDIAC CATHETERIZATION DISCHARGE INSTRUCTIONS     FOR SUDDEN AND SEVERE CHEST PAIN, SHORTNESS OF BREATH, EXCESSIVE BLEEDING, SIGNS OF STROKE, OR CHANGES IN MENTAL STATUS YOU SHOULD CALL 911 IMMEDIATELY.     If your provider has prescribed aspirin and/or clopidogrel (Plavix), or prasugrel (Effient), or ticagrelor (Brilinta), DO NOT STOP THESE MEDICATIONS for any reason without talking to your cardiologist first. If any of these were prescribed, you must take them every day without missing a single dose. If you are getting low on these medications, contact your provider immediately for a refill.     FOR NEXT 24 HOURS  - Upon discharge, you should return home and rest for the remainder of the day and evening. You do not have to stay on bed rest but should not be very active.  It is recommended a responsible adult be with you for the first 24 hours after the procedure.    - No driving for 24 hours after procedure. Please arrange for someone to drive you home from the hospital today.     - Do not drive, operate machinery, or use power tools for 24 hours after your procedure.     - Do not make any legal decisions for 24 hours after your procedure.     - Do not drink alcoholic beverages for 24 hours after your procedure.    WOUND CARE   *FOR RADIAL (WRIST) ACCESS*  ·      No lifting anything with affected arm, no bending or flexing affected wrist for 24 hours - for example, treat your wrist as if it is sprained.        ·      No lifting greater that 5 pounds with your affected arm for 5 days.  ·      Do not engage in vigorous activities (tennis, golf, bowling, weights) for at least 5 days after the procedure.  ·      Do not submerge the wrist in water for 7 days after the procedure.  ·      You should expect mild tingling in your hand and tenderness at the puncture site for up to 3 days.    - The transparent dressing should be removed from the site 24 hours after the procedure.  Wash the site gently with soap and  water. Rinse well and pat dry. Keep the area clean and dry. You may apply a Band-Aid to the site. Avoid lotions, ointments, or powders until fully healed.     - You may shower the day after your procedure.      - It is normal to notice a small bruise around the puncture site and/or a small grape sized or smaller lump. Any large bruising or large lump warrants a call to the office.     - If bleeding should occur, lay down and apply pressure to the affected area for 10 minutes.  If the bleeding stops notify your physician.  If there is a large amount of bleeding or spurting of blood CALL 911 immediately.  DO NOT drive yourself to the hospital.    - You may experience some tenderness, bruising or minimal inflammation.  If you have any concerns, you may contact the Cath Lab or if any of these symptoms become excessive, contact your cardiologist or go to the emergency room.     OTHER INSTRUCTIONS  - You may take acetaminophen (Tylenol) as directed for discomfort.  If pain is not relieved with acetaminophen (Tylenol), contact your doctor.    - If you notice or experience any of the following, you should notify your doctor or seek medical attention  Chest pain or discomfort  Change in mental status or weakness in extremities.  Dizziness, light headedness, or feeling faint.  Change in the site where the procedure was performed, such as bleeding or an increased area of bruising or swelling.  Tingling, numbness, pain, or coolness in the leg/arm beyond the site where the procedure was performed.  Signs of infection (i.e. shaking chills, temperature > 100 degrees Fahrenheit, warmth, redness) in the leg/arm area where the procedure was performed.  Changes in urination   Bloody or black stools  Vomiting blood  Severe nose bleeds  Any excessive bleeding    - If you DO NOT have an appointment with your cardiologist within 2-4 weeks following your procedure, please contact their office.

## 2024-07-29 NOTE — DISCHARGE INSTR - APPOINTMENTS
Follow up with PCP in 1-2 weeks    Follow up with Dr Stover 2-3 weeks [phone]  [facility], [address], [place] [address]

## 2024-07-31 ENCOUNTER — TELEPHONE (OUTPATIENT)
Dept: CARDIOLOGY | Facility: CLINIC | Age: 89
End: 2024-07-31
Payer: MEDICARE

## 2024-07-31 NOTE — TELEPHONE ENCOUNTER
Patient stated she was just released from [facility] after having STEMI and heart catheterization.  She was started on new medications and is now having frequent episodes of diarrhea.   New meds started:  aspirin 81mg   atorvastatin  80mg  dapagliflozin 5mg  levothyroxine 150mcg  Losartan 25mg  metoprolol succinate XL  25mg  nitroglycerin (Nitrostat)   ticagrelor 90mg    She is asking if it is ok to take OTC antidiarrheals?

## 2024-07-31 NOTE — TELEPHONE ENCOUNTER
Spoke with patient and aware- ok to take Immodium. Patient also needed hospital follow up appt with Dr. Stover- scheduled 8/27 at 2pm [place] office.  All questions/concerns addressed. Contact info provided if patient has any issues.

## 2024-08-12 PROBLEM — R79.89 ELEVATED PTHRP LEVEL: Status: ACTIVE | Noted: 2024-06-05

## 2024-08-12 PROBLEM — E83.52 HYPERCALCEMIA: Status: ACTIVE | Noted: 2024-06-05

## 2024-08-12 PROBLEM — I10 ESSENTIAL HYPERTENSION: Status: ACTIVE | Noted: 2024-08-12

## 2024-08-12 PROBLEM — E03.9 HYPOTHYROIDISM: Status: ACTIVE | Noted: 2024-08-12

## 2024-08-12 PROBLEM — E78.00 HIGH CHOLESTEROL: Status: ACTIVE | Noted: 2024-08-12

## 2024-08-12 PROBLEM — M10.9 GOUT: Status: ACTIVE | Noted: 2024-08-12

## 2024-08-27 ENCOUNTER — APPOINTMENT (OUTPATIENT)
Dept: CARDIOLOGY | Facility: CLINIC | Age: 89
End: 2024-08-27
Payer: MEDICARE

## 2024-08-27 VITALS
WEIGHT: 135.9 LBS | HEART RATE: 98 BPM | DIASTOLIC BLOOD PRESSURE: 60 MMHG | BODY MASS INDEX: 26.68 KG/M2 | OXYGEN SATURATION: 95 % | HEIGHT: 60 IN | SYSTOLIC BLOOD PRESSURE: 122 MMHG

## 2024-08-27 DIAGNOSIS — N18.30 STAGE 3 CHRONIC KIDNEY DISEASE, UNSPECIFIED WHETHER STAGE 3A OR 3B CKD (MULTI): ICD-10-CM

## 2024-08-27 DIAGNOSIS — E83.52 HYPERCALCEMIA: ICD-10-CM

## 2024-08-27 DIAGNOSIS — E78.5 DYSLIPIDEMIA: Primary | ICD-10-CM

## 2024-08-27 DIAGNOSIS — I21.02 ST ELEVATION MYOCARDIAL INFARCTION INVOLVING LEFT ANTERIOR DESCENDING (LAD) CORONARY ARTERY (MULTI): ICD-10-CM

## 2024-08-27 DIAGNOSIS — I10 PRIMARY HYPERTENSION: ICD-10-CM

## 2024-08-27 DIAGNOSIS — I10 ESSENTIAL HYPERTENSION: ICD-10-CM

## 2024-08-27 PROCEDURE — 99215 OFFICE O/P EST HI 40 MIN: CPT | Performed by: INTERNAL MEDICINE

## 2024-08-27 PROCEDURE — 3078F DIAST BP <80 MM HG: CPT | Performed by: INTERNAL MEDICINE

## 2024-08-27 PROCEDURE — 1159F MED LIST DOCD IN RCRD: CPT | Performed by: INTERNAL MEDICINE

## 2024-08-27 PROCEDURE — 3074F SYST BP LT 130 MM HG: CPT | Performed by: INTERNAL MEDICINE

## 2024-08-27 PROCEDURE — 93010 ELECTROCARDIOGRAM REPORT: CPT | Performed by: INTERNAL MEDICINE

## 2024-08-27 PROCEDURE — 93005 ELECTROCARDIOGRAM TRACING: CPT | Performed by: INTERNAL MEDICINE

## 2024-08-27 PROCEDURE — 1111F DSCHRG MED/CURRENT MED MERGE: CPT | Performed by: INTERNAL MEDICINE

## 2024-08-27 RX ORDER — LANCETS 33 GAUGE
1 EACH MISCELLANEOUS DAILY
COMMUNITY
Start: 2024-08-05

## 2024-08-27 RX ORDER — LANCETS 30 GAUGE
1 EACH MISCELLANEOUS DAILY
COMMUNITY
Start: 2024-08-05

## 2024-08-27 NOTE — PROGRESS NOTES
[facility] Cardiology Outpatient Follow-up Visit     Reason for Visit: CAD    HPI: Delmi Talavera is a 89 y.o.  female who presents today for followup.     The patient is a 89-year-old female with a history of hypertension, dyslipidemia, hypothyroidism who presents with stuttering chest pain for 3 days. Twelve-lead ECG demonstrates anterior Q waves with ST elevations as well as ST elevations in the lateral leads. She was emergently taken to cardiac catheterization. She had a 99% proximal LAD stenosis as well as an 80 to 90% circumflex artery stenosis. She underwent drug-eluting stenting of both these vessels. Peak troponin was 89,000. Echocardiogram demonstrated an ejection fraction of 35 to 40%. She was placed on medical therapy. She was given a dose of Lasix. She is placed on Farxiga to decrease her risk of repeat hospitalization for heart failure.  She presents today for follow-up.    She denies any chest discomfort or shortness of breath.  She admits to an occasional dyspnea on exertion.  She denies any palpitations, lightheadedness, syncope, orthopnea, PND, lower extremity edema.  She does admit to fatigue with activity.    7/27/2024 cardiac catheterization: Successful [place] frontier ALFREDO to the LAD, successful [place] frontier ALFREDO of the circumflex, mild RCA disease, moderate LV dysfunction with an ejection fraction of 35%, elevated left heart filling pressures.  7/27/2024 echo: Ejection fraction 35 to 40%, mild aortic stenosis.  7/27/2024 , , HDL 40, triglycerides 290.  8/13/2024 potassium 4.9, BUN 35, creatinine 1.83.  8/27/2024 ECG:Normal sinus rhythm, left axis deviation, right bundle branch block.  Septal infarct.    Past Medical History:   She has no past medical history on file.    Surgical History:   She has a past surgical history that includes Cardiac catheterization (N/A, 7/27/2024) and Cardiac catheterization (N/A, 7/27/2024).    Family History:   No family history on  file.    Allergies:  Allopurinol and Levothyroxine sodium     Social History:   No smoking    Prior Cardiovascular Testing (Personally Reviewed):     Review of Systems:  Review of Systems   All other systems reviewed and are negative.      Outpatient Medications:    Current Outpatient Medications:     aspirin 81 mg chewable tablet, Chew 1 tablet (81 mg) once daily., Disp: 90 tablet, Rfl: 3    atorvastatin (Lipitor) 80 mg tablet, Take 1 tablet (80 mg) by mouth once daily at bedtime., Disp: 30 tablet, Rfl: 1    dapagliflozin propanediol (Farxiga) 5 mg, Take 1 tablet (5 mg) by mouth once every 24 hours., Disp: 30 tablet, Rfl: 1    levothyroxine (Synthroid, Levoxyl) 150 mcg tablet, Take 1 tablet (150 mcg) by mouth early in the morning.. Take on an empty stomach at the same time each day, either 30 to 60 minutes prior to breakfast, Disp: 30 tablet, Rfl: 1    losartan (Cozaar) 25 mg tablet, Take 1 tablet (25 mg) by mouth once daily., Disp: 30 tablet, Rfl: 1    metoprolol succinate XL (Toprol-XL) 25 mg 24 hr tablet, Take 1 tablet (25 mg) by mouth once daily. Do not crush or chew., Disp: 30 tablet, Rfl: 1    nitroglycerin (Nitrostat) 0.4 mg SL tablet, Place 1 tablet (0.4 mg) under the tongue every 5 minutes if needed for chest pain., Disp: 30 tablet, Rfl: 0    ticagrelor (Brilinta) 90 mg tablet, Take 1 tablet (90 mg) by mouth 2 times a day., Disp: 60 tablet, Rfl: 11     Last Recorded Vitals  There were no vitals taken for this visit.    Physical Exam:    Physical Exam  Vitals reviewed.   Constitutional:       Appearance: Normal appearance.   HENT:      Head: Normocephalic and atraumatic.      Mouth/Throat:      Mouth: Mucous membranes are moist.      Pharynx: Oropharynx is clear.   Eyes:      Extraocular Movements: Extraocular movements intact.      Conjunctiva/sclera: Conjunctivae normal.   Cardiovascular:      Rate and Rhythm: Normal rate and regular rhythm.      Pulses: Normal pulses.      Heart sounds: Normal heart  "sounds.   Pulmonary:      Effort: Pulmonary effort is normal.      Breath sounds: Normal breath sounds.   Abdominal:      General: Bowel sounds are normal.      Palpations: Abdomen is soft.   Musculoskeletal:         General: No swelling.      Cervical back: Neck supple.   Skin:     General: Skin is warm and dry.   Neurological:      General: No focal deficit present.      Mental Status: She is alert.   Psychiatric:         Mood and Affect: Mood normal.         Behavior: Behavior normal.         Lab/Radiology/Diagnostic Review:    Labs    Lab Results   Component Value Date    GLUCOSE 189 (H) 07/29/2024    CALCIUM 9.5 07/29/2024     (L) 07/29/2024    K 4.2 07/29/2024    CO2 25 07/29/2024    CL 98 07/29/2024    BUN 41 (H) 07/29/2024    CREATININE 1.94 (H) 07/29/2024       Lab Results   Component Value Date    WBC 19.1 (H) 07/28/2024    HGB 13.0 07/28/2024    HCT 39.3 07/28/2024    MCV 93 07/28/2024     07/28/2024       Lab Results   Component Value Date    CHOL 234 (H) 07/27/2024     Lab Results   Component Value Date    HDL 40.4 07/27/2024     Lab Results   Component Value Date    LDLCALC 136 (H) 07/27/2024     Lab Results   Component Value Date    TRIG 290 (H) 07/27/2024     No components found for: \"CHOLHDL\"    No results found for: \"BNP\"    No results found for: \"TSH\"    Assessment:   Patient is doing reasonably well after revascularization of the LAD and circumflex arteries in the setting of delayed presentation anterior STEMI.    Patient is on reasonable medical therapy including metoprolol succinate, losartan, aspirin, ticagrelor and atorvastatin.    Patient should continue DAPT for 1 year.    Patient is on Farxiga with a creatinine of 1.83.  Will have to keep an eye on her renal function.    Given the patient's potassium of 4.9, would be hesitant to start any other medications such as an aldosterone antagonist.  Would also not switch from ARB to ARNI at this time.    Will go ahead and enroll the " patient in cardiac rehab.    Will repeat the patient's lipid panel prior to her next visit.    Patient will need a repeat echocardiogram prior to her next visit.    Patient will follow-up with me in 3 months or sooner if she has more problems.    Eduardo Stover MD

## 2024-08-27 NOTE — LETTER
August 27, 2024     Ethan Miranda DO  [address] [address]    Patient: Delmi Talavera   YOB: 1934   Date of Visit: 8/27/2024       Dear Dr. Ethan Miranda DO:    Thank you for referring Delmi Talavera to me for evaluation. Below are my notes for this consultation.  If you have questions, please do not hesitate to call me. I look forward to following your patient along with you.       Sincerely,     Eduardo Stover MD      CC: MD Monik Antonio MD  ______________________________________________________________________________________        [facility] Cardiology Outpatient Follow-up Visit     Reason for Visit: CAD    HPI: Delmi Talavera is a 89 y.o.  female who presents today for followup.     The patient is a 89-year-old female with a history of hypertension, dyslipidemia, hypothyroidism who presents with stuttering chest pain for 3 days. Twelve-lead ECG demonstrates anterior Q waves with ST elevations as well as ST elevations in the lateral leads. She was emergently taken to cardiac catheterization. She had a 99% proximal LAD stenosis as well as an 80 to 90% circumflex artery stenosis. She underwent drug-eluting stenting of both these vessels. Peak troponin was 89,000. Echocardiogram demonstrated an ejection fraction of 35 to 40%. She was placed on medical therapy. She was given a dose of Lasix. She is placed on Farxiga to decrease her risk of repeat hospitalization for heart failure.  She presents today for follow-up.    She denies any chest discomfort or shortness of breath.  She admits to an occasional dyspnea on exertion.  She denies any palpitations, lightheadedness, syncope, orthopnea, PND, lower extremity edema.  She does admit to fatigue with activity.    7/27/2024 cardiac catheterization: Successful [place] ALFREDO to the LAD, successful [place] frontier ALFREDO of the circumflex, mild RCA disease, moderate LV dysfunction with an  ejection fraction of 35%, elevated left heart filling pressures.  7/27/2024 echo: Ejection fraction 35 to 40%, mild aortic stenosis.  7/27/2024 , , HDL 40, triglycerides 290.  8/13/2024 potassium 4.9, BUN 35, creatinine 1.83.  8/27/2024 ECG:Normal sinus rhythm, left axis deviation, right bundle branch block.  Septal infarct.    Past Medical History:   She has no past medical history on file.    Surgical History:   She has a past surgical history that includes Cardiac catheterization (N/A, 7/27/2024) and Cardiac catheterization (N/A, 7/27/2024).    Family History:   No family history on file.    Allergies:  Allopurinol and Levothyroxine sodium     Social History:   No smoking    Prior Cardiovascular Testing (Personally Reviewed):     Review of Systems:  Review of Systems   All other systems reviewed and are negative.      Outpatient Medications:    Current Outpatient Medications:   •  aspirin 81 mg chewable tablet, Chew 1 tablet (81 mg) once daily., Disp: 90 tablet, Rfl: 3  •  atorvastatin (Lipitor) 80 mg tablet, Take 1 tablet (80 mg) by mouth once daily at bedtime., Disp: 30 tablet, Rfl: 1  •  dapagliflozin propanediol (Farxiga) 5 mg, Take 1 tablet (5 mg) by mouth once every 24 hours., Disp: 30 tablet, Rfl: 1  •  levothyroxine (Synthroid, Levoxyl) 150 mcg tablet, Take 1 tablet (150 mcg) by mouth early in the morning.. Take on an empty stomach at the same time each day, either 30 to 60 minutes prior to breakfast, Disp: 30 tablet, Rfl: 1  •  losartan (Cozaar) 25 mg tablet, Take 1 tablet (25 mg) by mouth once daily., Disp: 30 tablet, Rfl: 1  •  metoprolol succinate XL (Toprol-XL) 25 mg 24 hr tablet, Take 1 tablet (25 mg) by mouth once daily. Do not crush or chew., Disp: 30 tablet, Rfl: 1  •  nitroglycerin (Nitrostat) 0.4 mg SL tablet, Place 1 tablet (0.4 mg) under the tongue every 5 minutes if needed for chest pain., Disp: 30 tablet, Rfl: 0  •  ticagrelor (Brilinta) 90 mg tablet, Take 1 tablet (90 mg) by  "mouth 2 times a day., Disp: 60 tablet, Rfl: 11     Last Recorded Vitals  There were no vitals taken for this visit.    Physical Exam:    Physical Exam  Vitals reviewed.   Constitutional:       Appearance: Normal appearance.   HENT:      Head: Normocephalic and atraumatic.      Mouth/Throat:      Mouth: Mucous membranes are moist.      Pharynx: Oropharynx is clear.   Eyes:      Extraocular Movements: Extraocular movements intact.      Conjunctiva/sclera: Conjunctivae normal.   Cardiovascular:      Rate and Rhythm: Normal rate and regular rhythm.      Pulses: Normal pulses.      Heart sounds: Normal heart sounds.   Pulmonary:      Effort: Pulmonary effort is normal.      Breath sounds: Normal breath sounds.   Abdominal:      General: Bowel sounds are normal.      Palpations: Abdomen is soft.   Musculoskeletal:         General: No swelling.      Cervical back: Neck supple.   Skin:     General: Skin is warm and dry.   Neurological:      General: No focal deficit present.      Mental Status: She is alert.   Psychiatric:         Mood and Affect: Mood normal.         Behavior: Behavior normal.         Lab/Radiology/Diagnostic Review:    Labs    Lab Results   Component Value Date    GLUCOSE 189 (H) 07/29/2024    CALCIUM 9.5 07/29/2024     (L) 07/29/2024    K 4.2 07/29/2024    CO2 25 07/29/2024    CL 98 07/29/2024    BUN 41 (H) 07/29/2024    CREATININE 1.94 (H) 07/29/2024       Lab Results   Component Value Date    WBC 19.1 (H) 07/28/2024    HGB 13.0 07/28/2024    HCT 39.3 07/28/2024    MCV 93 07/28/2024     07/28/2024       Lab Results   Component Value Date    CHOL 234 (H) 07/27/2024     Lab Results   Component Value Date    HDL 40.4 07/27/2024     Lab Results   Component Value Date    LDLCALC 136 (H) 07/27/2024     Lab Results   Component Value Date    TRIG 290 (H) 07/27/2024     No components found for: \"CHOLHDL\"    No results found for: \"BNP\"    No results found for: \"TSH\"    Assessment:   Patient is doing " reasonably well after revascularization of the LAD and circumflex arteries in the setting of delayed presentation anterior STEMI.    Patient is on reasonable medical therapy including metoprolol succinate, losartan, aspirin, ticagrelor and atorvastatin.    Patient should continue DAPT for 1 year.    Patient is on Farxiga with a creatinine of 1.83.  Will have to keep an eye on her renal function.    Given the patient's potassium of 4.9, would be hesitant to start any other medications such as an aldosterone antagonist.  Would also not switch from ARB to ARNI at this time.    Will go ahead and enroll the patient in cardiac rehab.    Will repeat the patient's lipid panel prior to her next visit.    Patient will need a repeat echocardiogram prior to her next visit.    Patient will follow-up with me in 3 months or sooner if she has more problems.    Eduardo Stover MD

## 2024-08-27 NOTE — LETTER
August 27, 2024     No Recipients    Patient: Delmi Talavera   YOB: 1934   Date of Visit: 8/27/2024       Dear Dr. Simpson Recipients:    Thank you for referring Delmi Talavera to me for evaluation. Below are my notes for this consultation.  If you have questions, please do not hesitate to call me. I look forward to following your patient along with you.       Sincerely,     Eduardo Stover MD      CC: No Recipients  ______________________________________________________________________________________        [facility] Cardiology Outpatient Follow-up Visit     Reason for Visit: CAD    HPI: Delmi Talavera is a 89 y.o.  female who presents today for followup.     The patient is a 89-year-old female with a history of hypertension, dyslipidemia, hypothyroidism who presents with stuttering chest pain for 3 days. Twelve-lead ECG demonstrates anterior Q waves with ST elevations as well as ST elevations in the lateral leads. She was emergently taken to cardiac catheterization. She had a 99% proximal LAD stenosis as well as an 80 to 90% circumflex artery stenosis. She underwent drug-eluting stenting of both these vessels. Peak troponin was 89,000. Echocardiogram demonstrated an ejection fraction of 35 to 40%. She was placed on medical therapy. She was given a dose of Lasix. She is placed on Farxiga to decrease her risk of repeat hospitalization for heart failure.  She presents today for follow-up.    She denies any chest discomfort or shortness of breath.  She admits to an occasional dyspnea on exertion.  She denies any palpitations, lightheadedness, syncope, orthopnea, PND, lower extremity edema.  She does admit to fatigue with activity.    7/27/2024 cardiac catheterization: Successful Vance frontier ALFREDO to the LAD, successful Vance frontier ALFREDO of the circumflex, mild RCA disease, moderate LV dysfunction with an ejection fraction of 35%, elevated left heart filling pressures.  7/27/2024 echo: Ejection  fraction 35 to 40%, mild aortic stenosis.  7/27/2024 , , HDL 40, triglycerides 290.  8/13/2024 potassium 4.9, BUN 35, creatinine 1.83.  8/27/2024 ECG:Normal sinus rhythm, left axis deviation, right bundle branch block.  Septal infarct.    Past Medical History:   She has no past medical history on file.    Surgical History:   She has a past surgical history that includes Cardiac catheterization (N/A, 7/27/2024) and Cardiac catheterization (N/A, 7/27/2024).    Family History:   No family history on file.    Allergies:  Allopurinol and Levothyroxine sodium     Social History:   No smoking    Prior Cardiovascular Testing (Personally Reviewed):     Review of Systems:  Review of Systems   All other systems reviewed and are negative.      Outpatient Medications:    Current Outpatient Medications:   •  aspirin 81 mg chewable tablet, Chew 1 tablet (81 mg) once daily., Disp: 90 tablet, Rfl: 3  •  atorvastatin (Lipitor) 80 mg tablet, Take 1 tablet (80 mg) by mouth once daily at bedtime., Disp: 30 tablet, Rfl: 1  •  dapagliflozin propanediol (Farxiga) 5 mg, Take 1 tablet (5 mg) by mouth once every 24 hours., Disp: 30 tablet, Rfl: 1  •  levothyroxine (Synthroid, Levoxyl) 150 mcg tablet, Take 1 tablet (150 mcg) by mouth early in the morning.. Take on an empty stomach at the same time each day, either 30 to 60 minutes prior to breakfast, Disp: 30 tablet, Rfl: 1  •  losartan (Cozaar) 25 mg tablet, Take 1 tablet (25 mg) by mouth once daily., Disp: 30 tablet, Rfl: 1  •  metoprolol succinate XL (Toprol-XL) 25 mg 24 hr tablet, Take 1 tablet (25 mg) by mouth once daily. Do not crush or chew., Disp: 30 tablet, Rfl: 1  •  nitroglycerin (Nitrostat) 0.4 mg SL tablet, Place 1 tablet (0.4 mg) under the tongue every 5 minutes if needed for chest pain., Disp: 30 tablet, Rfl: 0  •  ticagrelor (Brilinta) 90 mg tablet, Take 1 tablet (90 mg) by mouth 2 times a day., Disp: 60 tablet, Rfl: 11     Last Recorded Vitals  There were no  "vitals taken for this visit.    Physical Exam:    Physical Exam  Vitals reviewed.   Constitutional:       Appearance: Normal appearance.   HENT:      Head: Normocephalic and atraumatic.      Mouth/Throat:      Mouth: Mucous membranes are moist.      Pharynx: Oropharynx is clear.   Eyes:      Extraocular Movements: Extraocular movements intact.      Conjunctiva/sclera: Conjunctivae normal.   Cardiovascular:      Rate and Rhythm: Normal rate and regular rhythm.      Pulses: Normal pulses.      Heart sounds: Normal heart sounds.   Pulmonary:      Effort: Pulmonary effort is normal.      Breath sounds: Normal breath sounds.   Abdominal:      General: Bowel sounds are normal.      Palpations: Abdomen is soft.   Musculoskeletal:         General: No swelling.      Cervical back: Neck supple.   Skin:     General: Skin is warm and dry.   Neurological:      General: No focal deficit present.      Mental Status: She is alert.   Psychiatric:         Mood and Affect: Mood normal.         Behavior: Behavior normal.         Lab/Radiology/Diagnostic Review:    Labs    Lab Results   Component Value Date    GLUCOSE 189 (H) 07/29/2024    CALCIUM 9.5 07/29/2024     (L) 07/29/2024    K 4.2 07/29/2024    CO2 25 07/29/2024    CL 98 07/29/2024    BUN 41 (H) 07/29/2024    CREATININE 1.94 (H) 07/29/2024       Lab Results   Component Value Date    WBC 19.1 (H) 07/28/2024    HGB 13.0 07/28/2024    HCT 39.3 07/28/2024    MCV 93 07/28/2024     07/28/2024       Lab Results   Component Value Date    CHOL 234 (H) 07/27/2024     Lab Results   Component Value Date    HDL 40.4 07/27/2024     Lab Results   Component Value Date    LDLCALC 136 (H) 07/27/2024     Lab Results   Component Value Date    TRIG 290 (H) 07/27/2024     No components found for: \"CHOLHDL\"    No results found for: \"BNP\"    No results found for: \"TSH\"    Assessment:   Patient is doing reasonably well after revascularization of the LAD and circumflex arteries in the " setting of delayed presentation anterior STEMI.    Patient is on reasonable medical therapy including metoprolol succinate, losartan, aspirin, ticagrelor and atorvastatin.    Patient should continue DAPT for 1 year.    Patient is on Farxiga with a creatinine of 1.83.  Will have to keep an eye on her renal function.    Given the patient's potassium of 4.9, would be hesitant to start any other medications such as an aldosterone antagonist.  Would also not switch from ARB to ARNI at this time.    Will go ahead and enroll the patient in cardiac rehab.    Will repeat the patient's lipid panel prior to her next visit.    Patient will need a repeat echocardiogram prior to her next visit.    Patient will follow-up with me in 3 months or sooner if she has more problems.    Eduardo Stover MD

## 2024-09-09 ENCOUNTER — TELEPHONE (OUTPATIENT)
Dept: CARDIOLOGY | Facility: CLINIC | Age: 89
End: 2024-09-09
Payer: MEDICARE

## 2024-09-09 DIAGNOSIS — E83.52 HYPERCALCEMIA: ICD-10-CM

## 2024-09-09 DIAGNOSIS — E78.5 DYSLIPIDEMIA: ICD-10-CM

## 2024-09-09 DIAGNOSIS — I21.21 ST ELEVATION (STEMI) MYOCARDIAL INFARCTION INVOLVING LEFT CIRCUMFLEX CORONARY ARTERY (MULTI): ICD-10-CM

## 2024-09-10 RX ORDER — ATORVASTATIN CALCIUM 10 MG/1
10 TABLET, FILM COATED ORAL NIGHTLY
Qty: 30 TABLET | Refills: 11 | Status: SHIPPED | OUTPATIENT
Start: 2024-09-10

## 2024-09-10 NOTE — TELEPHONE ENCOUNTER
PCP wanted to make Dr. Stover aware that on 9/6 CMP - liver enzymes elevated. Pt currently taking Lipitor 80mg daily.

## 2024-09-24 ENCOUNTER — LAB (OUTPATIENT)
Dept: LAB | Facility: LAB | Age: 89
End: 2024-09-24
Payer: MEDICARE

## 2024-09-24 DIAGNOSIS — E78.5 DYSLIPIDEMIA: ICD-10-CM

## 2024-09-24 LAB
ALBUMIN SERPL BCP-MCNC: 3.6 G/DL (ref 3.4–5)
ALP SERPL-CCNC: 269 U/L (ref 33–136)
ALT SERPL W P-5'-P-CCNC: 118 U/L (ref 7–45)
ANION GAP SERPL CALC-SCNC: 14 MMOL/L (ref 10–20)
AST SERPL W P-5'-P-CCNC: 133 U/L (ref 9–39)
BILIRUB SERPL-MCNC: 1 MG/DL (ref 0–1.2)
BUN SERPL-MCNC: 38 MG/DL (ref 6–23)
CALCIUM SERPL-MCNC: 9.7 MG/DL (ref 8.6–10.3)
CHLORIDE SERPL-SCNC: 105 MMOL/L (ref 98–107)
CHOLEST SERPL-MCNC: 126 MG/DL (ref 0–199)
CHOLESTEROL/HDL RATIO: 4.1
CO2 SERPL-SCNC: 22 MMOL/L (ref 21–32)
CREAT SERPL-MCNC: 1.75 MG/DL (ref 0.5–1.05)
EGFRCR SERPLBLD CKD-EPI 2021: 27 ML/MIN/1.73M*2
GLUCOSE SERPL-MCNC: 236 MG/DL (ref 74–99)
HDLC SERPL-MCNC: 30.4 MG/DL
LDLC SERPL CALC-MCNC: 57 MG/DL
NON HDL CHOLESTEROL: 96 MG/DL (ref 0–149)
POTASSIUM SERPL-SCNC: 4.7 MMOL/L (ref 3.5–5.3)
PROT SERPL-MCNC: 7.9 G/DL (ref 6.4–8.2)
SODIUM SERPL-SCNC: 136 MMOL/L (ref 136–145)
TRIGL SERPL-MCNC: 191 MG/DL (ref 0–149)
VLDL: 38 MG/DL (ref 0–40)

## 2024-09-24 PROCEDURE — 80053 COMPREHEN METABOLIC PANEL: CPT

## 2024-09-24 PROCEDURE — 80061 LIPID PANEL: CPT

## 2024-09-24 PROCEDURE — 36415 COLL VENOUS BLD VENIPUNCTURE: CPT

## 2024-11-11 ENCOUNTER — APPOINTMENT (OUTPATIENT)
Dept: LAB | Facility: CLINIC | Age: 89
End: 2024-11-11
Payer: MEDICARE

## 2024-11-13 ENCOUNTER — APPOINTMENT (OUTPATIENT)
Dept: CARDIOLOGY | Facility: CLINIC | Age: 89
End: 2024-11-13
Payer: MEDICARE

## 2024-12-06 ENCOUNTER — APPOINTMENT (OUTPATIENT)
Dept: CARDIOLOGY | Facility: CLINIC | Age: 89
End: 2024-12-06
Payer: MEDICARE

## (undated) DEVICE — CUSTOM NAMIC STANDARD HEART CONVIENCE KIT W/ INTEGRATED COMPENSATOR MANIFOLD FOR UH/PARMA MEDICAL CENTER

## (undated) DEVICE — GUIDEWIRE, SION BLUE PTCA, 0.041 X 190CM, STRT

## (undated) DEVICE — GUIDEWIRE, INQWIRE, 3MM J, .035 X 210CM, FIXED

## (undated) DEVICE — CATHETER, GUIDING, LAUNCHER, 6FR EBU 3.5

## (undated) DEVICE — Device

## (undated) DEVICE — CATHETER, BALLOON, NC EUPHORA NONCOMPLIANT 3.5 X 12 X 142CM

## (undated) DEVICE — VALVE, HEMO, GUARDIAN II, W/GUIDEWIRE INSERTION TOOL & TORQUE

## (undated) DEVICE — INTRODUCER, GLIDESHEATH SLENDER A-KIT, 6FR 10CM

## (undated) DEVICE — CATHETER, DIAG, EXPO, 5FR 110CM, PIG 145

## (undated) DEVICE — CATHETER, BALLOON, EMERGE, PTCA, 12 X 2.50MM

## (undated) DEVICE — CATHETER, OPTITORQUE, 5FR, TIG, 1H/100CM

## (undated) DEVICE — GUIDEWIRE, RUN THROUGH WIRE, 180CM

## (undated) DEVICE — TR BAND, RADIAL COMPRESSION, STANDARD, 24CM

## (undated) DEVICE — DEVICE KIT, INFLATION, CUSTOM, PARMA